# Patient Record
Sex: MALE | Race: WHITE | NOT HISPANIC OR LATINO | URBAN - METROPOLITAN AREA
[De-identification: names, ages, dates, MRNs, and addresses within clinical notes are randomized per-mention and may not be internally consistent; named-entity substitution may affect disease eponyms.]

---

## 2017-01-01 ENCOUNTER — OUTPATIENT (OUTPATIENT)
Dept: OUTPATIENT SERVICES | Facility: HOSPITAL | Age: 32
LOS: 1 days | End: 2017-01-01

## 2017-03-08 DIAGNOSIS — R69 ILLNESS, UNSPECIFIED: ICD-10-CM

## 2017-04-17 ENCOUNTER — APPOINTMENT (OUTPATIENT)
Dept: ORTHOPEDIC SURGERY | Facility: CLINIC | Age: 32
End: 2017-04-17

## 2017-04-17 VITALS
BODY MASS INDEX: 27.98 KG/M2 | HEIGHT: 75 IN | SYSTOLIC BLOOD PRESSURE: 120 MMHG | DIASTOLIC BLOOD PRESSURE: 70 MMHG | WEIGHT: 225 LBS | HEART RATE: 69 BPM

## 2017-04-17 DIAGNOSIS — M54.2 CERVICALGIA: ICD-10-CM

## 2017-04-17 DIAGNOSIS — M54.12 RADICULOPATHY, CERVICAL REGION: ICD-10-CM

## 2017-04-17 DIAGNOSIS — S49.92XA UNSPECIFIED INJURY OF LEFT SHOULDER AND UPPER ARM, INITIAL ENCOUNTER: ICD-10-CM

## 2017-04-17 DIAGNOSIS — Z78.9 OTHER SPECIFIED HEALTH STATUS: ICD-10-CM

## 2017-04-17 DIAGNOSIS — Z56.0 UNEMPLOYMENT, UNSPECIFIED: ICD-10-CM

## 2017-04-17 SDOH — ECONOMIC STABILITY - INCOME SECURITY: UNEMPLOYMENT, UNSPECIFIED: Z56.0

## 2017-06-29 ENCOUNTER — RX RENEWAL (OUTPATIENT)
Age: 32
End: 2017-06-29

## 2017-07-27 ENCOUNTER — RX RENEWAL (OUTPATIENT)
Age: 32
End: 2017-07-27

## 2017-08-24 ENCOUNTER — RX RENEWAL (OUTPATIENT)
Age: 32
End: 2017-08-24

## 2017-11-14 ENCOUNTER — OUTPATIENT (OUTPATIENT)
Dept: OUTPATIENT SERVICES | Facility: HOSPITAL | Age: 32
LOS: 1 days | Discharge: STILL ADMITTED OR ADMINISTRATIVE | End: 2017-11-14

## 2017-11-24 DIAGNOSIS — F14.10 COCAINE ABUSE, UNCOMPLICATED: ICD-10-CM

## 2017-11-24 DIAGNOSIS — F11.10 OPIOID ABUSE, UNCOMPLICATED: ICD-10-CM

## 2018-06-06 PROBLEM — M54.5 LOW BACK PAIN: Status: ACTIVE | Noted: 2018-06-06

## 2018-06-06 PROBLEM — F11.93 OPIOID USE WITH WITHDRAWAL: Status: RESOLVED | Noted: 2018-06-06 | Resolved: 2018-06-06

## 2018-06-06 PROBLEM — Z87.898 HISTORY OF MEMORY LOSS: Status: RESOLVED | Noted: 2018-06-06 | Resolved: 2018-06-06

## 2018-06-06 PROBLEM — H10.30 ACUTE BACTERIAL CONJUNCTIVITIS: Status: RESOLVED | Noted: 2018-06-06 | Resolved: 2018-06-06

## 2018-06-06 PROBLEM — Z87.19 HISTORY OF GASTRITIS: Status: RESOLVED | Noted: 2018-06-06 | Resolved: 2018-06-06

## 2018-06-06 PROBLEM — Z87.2 HISTORY OF FOLLICULITIS: Status: RESOLVED | Noted: 2018-06-06 | Resolved: 2018-06-06

## 2018-06-06 PROBLEM — R06.02 SOB (SHORTNESS OF BREATH) ON EXERTION: Status: RESOLVED | Noted: 2018-06-06 | Resolved: 2018-06-06

## 2018-06-06 PROBLEM — F39 MOOD CHANGE: Status: RESOLVED | Noted: 2018-06-06 | Resolved: 2018-06-06

## 2018-06-06 PROBLEM — Z87.898 HISTORY OF NAUSEA: Status: RESOLVED | Noted: 2018-06-06 | Resolved: 2018-06-06

## 2018-06-06 PROBLEM — H60.90 OTITIS EXTERNA: Status: RESOLVED | Noted: 2018-06-06 | Resolved: 2018-06-06

## 2018-06-06 PROBLEM — Z87.898 HISTORY OF HEADACHE: Status: RESOLVED | Noted: 2018-06-06 | Resolved: 2018-06-06

## 2018-06-06 PROBLEM — Z87.09 HISTORY OF SINUSITIS: Status: RESOLVED | Noted: 2018-06-06 | Resolved: 2018-06-06

## 2018-06-06 PROBLEM — R45.1 AGITATION: Status: RESOLVED | Noted: 2018-06-06 | Resolved: 2018-06-06

## 2018-06-06 PROBLEM — R23.8 WOUND OF SKIN: Status: ACTIVE | Noted: 2018-06-06

## 2018-06-06 PROBLEM — T30.4 CHEMICAL BURN: Status: RESOLVED | Noted: 2018-06-06 | Resolved: 2018-06-06

## 2018-06-06 PROBLEM — Z87.19 HISTORY OF ACUTE CHOLECYSTITIS: Status: RESOLVED | Noted: 2018-06-06 | Resolved: 2018-06-06

## 2018-06-06 PROBLEM — Z87.898 HISTORY OF ABDOMINAL PAIN: Status: RESOLVED | Noted: 2018-06-06 | Resolved: 2018-06-06

## 2018-06-06 PROBLEM — R41.3 MEMORY PROBLEM: Status: RESOLVED | Noted: 2018-06-06 | Resolved: 2018-06-06

## 2018-06-06 PROBLEM — F19.10 SUBSTANCE ABUSE: Status: ACTIVE | Noted: 2018-06-06

## 2018-06-06 PROBLEM — Z87.828 HISTORY OF MOTOR VEHICLE ACCIDENT: Status: RESOLVED | Noted: 2018-06-06 | Resolved: 2018-06-06

## 2018-06-06 PROBLEM — Z87.898 HISTORY OF WHEEZING: Status: RESOLVED | Noted: 2018-06-06 | Resolved: 2018-06-06

## 2018-06-06 PROBLEM — K62.5 BRBPR (BRIGHT RED BLOOD PER RECTUM): Status: RESOLVED | Noted: 2018-06-06 | Resolved: 2018-06-06

## 2018-06-06 PROBLEM — Z87.39 HISTORY OF MUSCLE SPASM: Status: RESOLVED | Noted: 2018-06-06 | Resolved: 2018-06-06

## 2018-06-06 PROBLEM — H01.003 BLEPHARITIS OF EYELID OF RIGHT EYE: Status: ACTIVE | Noted: 2018-06-06

## 2018-06-06 PROBLEM — N45.3 ORCHITIS AND EPIDIDYMITIS: Status: RESOLVED | Noted: 2018-06-06 | Resolved: 2018-06-06

## 2018-06-06 PROBLEM — M54.9 BACK PAIN: Status: ACTIVE | Noted: 2018-06-06

## 2018-06-06 PROBLEM — Z87.438 HISTORY OF PROSTATITIS: Status: RESOLVED | Noted: 2018-06-06 | Resolved: 2018-06-06

## 2018-06-06 PROBLEM — Z87.2 HISTORY OF INFECTION OF SKIN OR SUBCUTANEOUS TISSUE: Status: RESOLVED | Noted: 2018-06-06 | Resolved: 2018-06-06

## 2018-06-06 PROBLEM — R68.84 JAW PAIN: Status: RESOLVED | Noted: 2018-06-06 | Resolved: 2018-06-06

## 2018-06-06 PROBLEM — S00.80XA: Status: RESOLVED | Noted: 2018-06-06 | Resolved: 2018-06-06

## 2018-06-06 PROBLEM — B00.9 HSV-2 (HERPES SIMPLEX VIRUS 2) INFECTION: Status: ACTIVE | Noted: 2018-06-06

## 2018-06-06 PROBLEM — R05 CHRONIC COUGH: Status: ACTIVE | Noted: 2018-06-06

## 2018-06-06 PROBLEM — R06.02 SHORTNESS OF BREATH: Status: ACTIVE | Noted: 2018-06-06

## 2018-06-06 PROBLEM — Z87.2 HISTORY OF ACNE: Status: RESOLVED | Noted: 2018-06-06 | Resolved: 2018-06-06

## 2018-06-06 PROBLEM — R10.30 GROIN PAIN: Status: RESOLVED | Noted: 2018-06-06 | Resolved: 2018-06-06

## 2018-06-06 PROBLEM — Z87.2 HISTORY OF CONTACT DERMATITIS: Status: RESOLVED | Noted: 2018-06-06 | Resolved: 2018-06-06

## 2018-06-06 PROBLEM — M25.512 LEFT SHOULDER PAIN: Status: ACTIVE | Noted: 2018-06-06

## 2018-06-06 PROBLEM — E53.8 VITAMIN B12 DEFICIENCY: Status: ACTIVE | Noted: 2018-06-06

## 2018-06-06 PROBLEM — Z87.898 HISTORY OF FACIAL PAIN: Status: RESOLVED | Noted: 2018-06-06 | Resolved: 2018-06-06

## 2018-06-06 PROBLEM — Z87.898 HISTORY OF GAIT DISORDER: Status: RESOLVED | Noted: 2018-06-06 | Resolved: 2018-06-06

## 2018-06-06 PROBLEM — F80.89 DEFICIT IN COMMUNICATION DUE TO SLURRED SPEECH: Status: RESOLVED | Noted: 2018-06-06 | Resolved: 2018-06-06

## 2018-06-06 PROBLEM — D22.9 NUMEROUS MOLES: Status: ACTIVE | Noted: 2018-06-06

## 2018-06-06 PROBLEM — E55.9 VITAMIN D DEFICIENCY: Status: ACTIVE | Noted: 2018-06-06

## 2018-06-06 PROBLEM — Z87.09 HISTORY OF POSTNASAL DRIP: Status: RESOLVED | Noted: 2018-06-06 | Resolved: 2018-06-06

## 2018-06-06 PROBLEM — E87.5 HIGH POTASSIUM: Status: ACTIVE | Noted: 2018-06-06

## 2018-06-08 ENCOUNTER — APPOINTMENT (OUTPATIENT)
Age: 33
End: 2018-06-08
Payer: MEDICAID

## 2018-06-08 VITALS
BODY MASS INDEX: 27.98 KG/M2 | DIASTOLIC BLOOD PRESSURE: 80 MMHG | WEIGHT: 225 LBS | HEIGHT: 75 IN | SYSTOLIC BLOOD PRESSURE: 130 MMHG | TEMPERATURE: 97.8 F

## 2018-06-08 DIAGNOSIS — H60.90 UNSPECIFIED OTITIS EXTERNA, UNSPECIFIED EAR: ICD-10-CM

## 2018-06-08 DIAGNOSIS — N45.3 EPIDIDYMO-ORCHITIS: ICD-10-CM

## 2018-06-08 DIAGNOSIS — Z87.19 PERSONAL HISTORY OF OTHER DISEASES OF THE DIGESTIVE SYSTEM: ICD-10-CM

## 2018-06-08 DIAGNOSIS — Z87.09 PERSONAL HISTORY OF OTHER DISEASES OF THE RESPIRATORY SYSTEM: ICD-10-CM

## 2018-06-08 DIAGNOSIS — Z87.898 PERSONAL HISTORY OF OTHER SPECIFIED CONDITIONS: ICD-10-CM

## 2018-06-08 DIAGNOSIS — M54.9 DORSALGIA, UNSPECIFIED: ICD-10-CM

## 2018-06-08 DIAGNOSIS — M54.5 LOW BACK PAIN: ICD-10-CM

## 2018-06-08 DIAGNOSIS — E55.9 VITAMIN D DEFICIENCY, UNSPECIFIED: ICD-10-CM

## 2018-06-08 DIAGNOSIS — R10.30 LOWER ABDOMINAL PAIN, UNSPECIFIED: ICD-10-CM

## 2018-06-08 DIAGNOSIS — Z87.2 PERSONAL HISTORY OF DISEASES OF THE SKIN AND SUBCUTANEOUS TISSUE: ICD-10-CM

## 2018-06-08 DIAGNOSIS — E53.8 DEFICIENCY OF OTHER SPECIFIED B GROUP VITAMINS: ICD-10-CM

## 2018-06-08 DIAGNOSIS — R06.02 SHORTNESS OF BREATH: ICD-10-CM

## 2018-06-08 DIAGNOSIS — D22.9 MELANOCYTIC NEVI, UNSPECIFIED: ICD-10-CM

## 2018-06-08 DIAGNOSIS — K62.5 HEMORRHAGE OF ANUS AND RECTUM: ICD-10-CM

## 2018-06-08 DIAGNOSIS — R23.8 OTHER SKIN CHANGES: ICD-10-CM

## 2018-06-08 DIAGNOSIS — Z87.828 PERSONAL HISTORY OF OTHER (HEALED) PHYSICAL INJURY AND TRAUMA: ICD-10-CM

## 2018-06-08 DIAGNOSIS — R68.84 JAW PAIN: ICD-10-CM

## 2018-06-08 DIAGNOSIS — H10.30 UNSPECIFIED ACUTE CONJUNCTIVITIS, UNSPECIFIED EYE: ICD-10-CM

## 2018-06-08 DIAGNOSIS — H01.003 UNSPECIFIED BLEPHARITIS RIGHT EYE, UNSPECIFIED EYELID: ICD-10-CM

## 2018-06-08 DIAGNOSIS — F80.89 OTHER DEVELOPMENTAL DISORDERS OF SPEECH AND LANGUAGE: ICD-10-CM

## 2018-06-08 DIAGNOSIS — Z87.438 PERSONAL HISTORY OF OTHER DISEASES OF MALE GENITAL ORGANS: ICD-10-CM

## 2018-06-08 DIAGNOSIS — F19.10 OTHER PSYCHOACTIVE SUBSTANCE ABUSE, UNCOMPLICATED: ICD-10-CM

## 2018-06-08 DIAGNOSIS — S00.80XA UNSPECIFIED SUPERFICIAL INJURY OF OTHER PART OF HEAD, INITIAL ENCOUNTER: ICD-10-CM

## 2018-06-08 DIAGNOSIS — E87.5 HYPERKALEMIA: ICD-10-CM

## 2018-06-08 DIAGNOSIS — L08.9 UNSPECIFIED SUPERFICIAL INJURY OF OTHER PART OF HEAD, INITIAL ENCOUNTER: ICD-10-CM

## 2018-06-08 DIAGNOSIS — F39 UNSPECIFIED MOOD [AFFECTIVE] DISORDER: ICD-10-CM

## 2018-06-08 DIAGNOSIS — M25.571 PAIN IN RIGHT ANKLE AND JOINTS OF RIGHT FOOT: ICD-10-CM

## 2018-06-08 DIAGNOSIS — R41.3 OTHER AMNESIA: ICD-10-CM

## 2018-06-08 DIAGNOSIS — F19.20 OTHER PSYCHOACTIVE SUBSTANCE DEPENDENCE, UNCOMPLICATED: ICD-10-CM

## 2018-06-08 DIAGNOSIS — B00.9 HERPESVIRAL INFECTION, UNSPECIFIED: ICD-10-CM

## 2018-06-08 DIAGNOSIS — R05 COUGH: ICD-10-CM

## 2018-06-08 DIAGNOSIS — T30.4 CORROSION OF UNSPECIFIED BODY REGION, UNSPECIFIED DEGREE: ICD-10-CM

## 2018-06-08 DIAGNOSIS — F11.93 OPIOID USE, UNSPECIFIED WITH WITHDRAWAL: ICD-10-CM

## 2018-06-08 DIAGNOSIS — Z87.39 PERSONAL HISTORY OF OTHER DISEASES OF THE MUSCULOSKELETAL SYSTEM AND CONNECTIVE TISSUE: ICD-10-CM

## 2018-06-08 DIAGNOSIS — N50.819 TESTICULAR PAIN, UNSPECIFIED: ICD-10-CM

## 2018-06-08 DIAGNOSIS — M25.512 PAIN IN LEFT SHOULDER: ICD-10-CM

## 2018-06-08 DIAGNOSIS — J30.2 OTHER SEASONAL ALLERGIC RHINITIS: ICD-10-CM

## 2018-06-08 DIAGNOSIS — R30.0 DYSURIA: ICD-10-CM

## 2018-06-08 DIAGNOSIS — R45.1 RESTLESSNESS AND AGITATION: ICD-10-CM

## 2018-06-08 PROCEDURE — 99214 OFFICE O/P EST MOD 30 MIN: CPT

## 2018-06-08 RX ORDER — HYDROXYZINE PAMOATE 50 MG/1
50 CAPSULE ORAL
Qty: 180 | Refills: 0 | Status: COMPLETED | COMMUNITY
Start: 2018-03-18

## 2018-06-08 RX ORDER — OFLOXACIN 3 MG/ML
0.3 SOLUTION/ DROPS OPHTHALMIC
Qty: 5 | Refills: 0 | Status: COMPLETED | COMMUNITY
Start: 2018-03-06

## 2018-06-08 RX ORDER — QUETIAPINE 400 MG/1
TABLET, FILM COATED ORAL
Refills: 0 | Status: COMPLETED | COMMUNITY
End: 2018-06-08

## 2018-06-08 RX ORDER — QUETIAPINE 25 MG/1
25 TABLET, FILM COATED ORAL
Refills: 0 | Status: COMPLETED | COMMUNITY
End: 2018-06-08

## 2018-06-08 RX ORDER — CHROMIUM 200 MCG
TABLET ORAL
Refills: 0 | Status: COMPLETED | COMMUNITY
End: 2018-06-08

## 2018-06-08 RX ORDER — DIAZEPAM 10 MG/1
10 TABLET ORAL
Qty: 45 | Refills: 0 | Status: COMPLETED | COMMUNITY
Start: 2018-03-16

## 2018-06-08 RX ORDER — PROMETHAZINE HYDROCHLORIDE AND DEXTROMETHORPHAN HYDROBROMIDE ORAL SOLUTION 15; 6.25 MG/5ML; MG/5ML
6.25-15 SOLUTION ORAL
Refills: 0 | Status: COMPLETED | COMMUNITY
End: 2018-06-08

## 2018-06-08 RX ORDER — ALPRAZOLAM 1 MG/1
1 TABLET ORAL
Qty: 28 | Refills: 0 | Status: COMPLETED | COMMUNITY
Start: 2018-05-21

## 2018-06-08 RX ORDER — SULFAMETHOXAZOLE AND TRIMETHOPRIM 800; 160 MG/1; MG/1
800-160 TABLET ORAL
Qty: 14 | Refills: 0 | Status: COMPLETED | COMMUNITY
Start: 2018-03-06

## 2018-06-08 RX ORDER — CEPHALEXIN 500 MG/1
500 CAPSULE ORAL 3 TIMES DAILY
Refills: 0 | Status: COMPLETED | COMMUNITY
Start: 2018-05-09 | End: 2018-06-08

## 2018-06-08 RX ORDER — HYDROCODONE BITARTRATE AND HOMATROPINE METHYLBROMIDE 5; 1.5 MG/5ML; MG/5ML
5-1.5 SOLUTION ORAL
Qty: 200 | Refills: 0 | Status: COMPLETED | COMMUNITY
Start: 2018-03-19

## 2018-06-08 RX ORDER — GABAPENTIN 300 MG/1
300 CAPSULE ORAL
Qty: 90 | Refills: 0 | Status: COMPLETED | COMMUNITY
Start: 2018-01-02

## 2018-06-08 RX ORDER — HYDROCODONE BITARTRATE AND HOMATROPINE METHYLBROMIDE 5; 1.5 MG/5ML; MG/5ML
5-1.5 SYRUP ORAL
Refills: 0 | Status: COMPLETED | COMMUNITY
End: 2018-06-08

## 2018-06-08 RX ORDER — AMOXICILLIN 875 MG/1
875 TABLET, FILM COATED ORAL
Qty: 20 | Refills: 0 | Status: COMPLETED | COMMUNITY
Start: 2017-12-28

## 2018-06-08 RX ORDER — QUETIAPINE FUMARATE 100 MG/1
100 TABLET ORAL
Qty: 30 | Refills: 0 | Status: COMPLETED | COMMUNITY
Start: 2017-12-26

## 2018-06-08 RX ORDER — GABAPENTIN 400 MG/1
400 CAPSULE ORAL
Qty: 90 | Refills: 0 | Status: COMPLETED | COMMUNITY
Start: 2017-12-21

## 2018-06-08 RX ORDER — VALACYCLOVIR 500 MG/1
500 TABLET, FILM COATED ORAL
Qty: 30 | Refills: 0 | Status: COMPLETED | COMMUNITY
Start: 2018-02-22

## 2018-06-08 RX ORDER — DICLOFENAC SODIUM 75 MG/1
75 TABLET, DELAYED RELEASE ORAL
Qty: 60 | Refills: 0 | Status: COMPLETED | COMMUNITY
Start: 2017-04-17 | End: 2018-06-08

## 2018-06-08 RX ORDER — ERYTHROMYCIN 5 MG/G
5 OINTMENT OPHTHALMIC
Qty: 4 | Refills: 0 | Status: COMPLETED | COMMUNITY
Start: 2018-01-26

## 2018-06-08 RX ORDER — NICOTINE POLACRILEX 2 MG/1
2 GUM, CHEWING ORAL
Qty: 110 | Refills: 0 | Status: COMPLETED | COMMUNITY
Start: 2017-12-21

## 2018-06-08 RX ORDER — FOLIC ACID 1 MG/1
1 TABLET ORAL
Qty: 30 | Refills: 0 | Status: COMPLETED | COMMUNITY
Start: 2018-05-02

## 2018-06-08 RX ORDER — ERGOCALCIFEROL (VITAMIN D2) 1250 MCG
50000 CAPSULE ORAL
Refills: 0 | Status: COMPLETED | COMMUNITY
End: 2018-06-08

## 2018-06-08 RX ORDER — CHOLECALCIFEROL (VITAMIN D3) 1250 MCG
1.25 MG CAPSULE ORAL
Qty: 2 | Refills: 0 | Status: COMPLETED | COMMUNITY
Start: 2018-05-02

## 2018-06-08 RX ORDER — CYANOCOBALAMIN (VITAMIN B-12) 1000 MCG
500 TABLET, SUBLINGUAL SUBLINGUAL
Refills: 0 | Status: COMPLETED | COMMUNITY
End: 2018-06-08

## 2018-06-08 NOTE — PLAN
[FreeTextEntry1] : Patient was instructed to go the emergency department for further evaluation of his right ankle pain. 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 Spoke with patient in depth for at least 25 minutes about the need to go into further detox program. Patient is in agreement with me and is willing to start detoxing as he is aware that he is abusing his medications.\par Patient will wait for family member to bring him to the hospital as he states his mother will go with him. He has only been giving him a small supply of pain medications to hold him over until he is evaluated and taken care of by emergency room. I also would like for him to get evaluated for possible detox program through the hospital. Patient will discuss with the hospital about detox programs available to him.

## 2018-06-08 NOTE — PHYSICAL EXAM
[de-identified] : right ankle swelling significant compared with left ankle. Slight bruising on right lateral aspect of right ankle. Unable to perform exam as patient extensive pain and refusal to touch his ankle or foot

## 2018-06-08 NOTE — HISTORY OF PRESENT ILLNESS
[FreeTextEntry8] : 33 y/o male present with complaints of swollen ankle, and coughing, also Pt would like to come of medication he has been taken, \par \par Patient presents to the office with right ankle pain and swelling for the last 24 hours. He states he fell into an irrigation whole causing him to twists his ankle and causing pain.\par He did not seek medical attention yesterday as he did not feel it was that bad. This morning his ankle is very swollen and with chronic throbbing pain a 10 out of 10.\par \par patient does have a long standing history of drug abuse and misuse.

## 2018-06-25 ENCOUNTER — RX RENEWAL (OUTPATIENT)
Age: 33
End: 2018-06-25

## 2018-07-26 PROBLEM — J30.2 SEASONAL ALLERGIES: Status: ACTIVE | Noted: 2018-06-06

## 2018-08-15 ENCOUNTER — APPOINTMENT (OUTPATIENT)
Dept: INTERNAL MEDICINE | Facility: CLINIC | Age: 33
End: 2018-08-15
Payer: MEDICAID

## 2018-08-15 VITALS
BODY MASS INDEX: 27.98 KG/M2 | TEMPERATURE: 97.3 F | DIASTOLIC BLOOD PRESSURE: 86 MMHG | HEIGHT: 75 IN | SYSTOLIC BLOOD PRESSURE: 138 MMHG | WEIGHT: 225 LBS

## 2018-08-15 DIAGNOSIS — R05 COUGH: ICD-10-CM

## 2018-08-15 DIAGNOSIS — Z13.89 ENCOUNTER FOR SCREENING FOR OTHER DISORDER: ICD-10-CM

## 2018-08-15 DIAGNOSIS — Z87.898 PERSONAL HISTORY OF OTHER SPECIFIED CONDITIONS: ICD-10-CM

## 2018-08-15 DIAGNOSIS — R09.82 POSTNASAL DRIP: ICD-10-CM

## 2018-08-15 DIAGNOSIS — Z87.09 PERSONAL HISTORY OF OTHER DISEASES OF THE RESPIRATORY SYSTEM: ICD-10-CM

## 2018-08-15 PROCEDURE — 99214 OFFICE O/P EST MOD 30 MIN: CPT

## 2018-08-15 RX ORDER — OXYCODONE AND ACETAMINOPHEN 10; 325 MG/1; MG/1
10-325 TABLET ORAL TWICE DAILY
Qty: 6 | Refills: 0 | Status: COMPLETED | COMMUNITY
Start: 2018-06-08 | End: 2018-08-15

## 2018-08-15 RX ORDER — QUETIAPINE FUMARATE 200 MG/1
200 TABLET ORAL
Qty: 60 | Refills: 0 | Status: COMPLETED | COMMUNITY
Start: 2018-03-16 | End: 2018-08-15

## 2018-08-15 RX ORDER — CLONAZEPAM 2 MG/1
2 TABLET ORAL
Qty: 60 | Refills: 0 | Status: COMPLETED | COMMUNITY
Start: 2018-03-02 | End: 2018-08-15

## 2018-08-15 RX ORDER — GABAPENTIN 600 MG/1
600 TABLET, COATED ORAL
Qty: 90 | Refills: 0 | Status: COMPLETED | COMMUNITY
Start: 2018-03-18 | End: 2018-08-15

## 2018-08-15 NOTE — PHYSICAL EXAM
[No Acute Distress] : no acute distress [Well Nourished] : well nourished [No Respiratory Distress] : no respiratory distress  [Clear to Auscultation] : lungs were clear to auscultation bilaterally [No Accessory Muscle Use] : no accessory muscle use [Normal Rate] : normal rate  [Regular Rhythm] : with a regular rhythm [Normal S1, S2] : normal S1 and S2 [Normal Affect] : the affect was normal [Normal Insight/Judgement] : insight and judgment were intact [de-identified] : Postnasal drip present nasal mucosal swelling and erythema

## 2018-08-15 NOTE — REVIEW OF SYSTEMS
[Insomnia] : insomnia [Anxiety] : no anxiety [Negative] : Cardiovascular [FreeTextEntry4] : Sinus pressure and congestion [FreeTextEntry6] : Productive cough

## 2018-08-15 NOTE — PLAN
[FreeTextEntry1] : Patient will restart Seroquel as given above for insomnia\par He was started medications for his upper respiratory symptoms patient has been advised no pain medication will be given in the form of cough medication.\par Patient is to followup one week if not feeling better or before then worsens

## 2018-08-15 NOTE — HISTORY OF PRESENT ILLNESS
[FreeTextEntry8] : 33 y/o male present with complaints of not feeling well and not being able to sleep \par Patient presents to the office today with complaint of insomnia after stopping his benzodiazepine medications for last 2 months\par He would like to restart a seroquel that he has been on previously\par The last 2 days patient has been with cough and sinus congestion no fevers or chills

## 2018-08-28 ENCOUNTER — RX RENEWAL (OUTPATIENT)
Age: 33
End: 2018-08-28

## 2018-10-10 ENCOUNTER — RX RENEWAL (OUTPATIENT)
Age: 33
End: 2018-10-10

## 2018-11-20 ENCOUNTER — RX RENEWAL (OUTPATIENT)
Age: 33
End: 2018-11-20

## 2019-01-16 ENCOUNTER — EMERGENCY (EMERGENCY)
Facility: HOSPITAL | Age: 34
LOS: 1 days | Discharge: HOME | End: 2019-01-16
Attending: EMERGENCY MEDICINE | Admitting: EMERGENCY MEDICINE
Payer: MEDICAID

## 2019-01-16 VITALS
RESPIRATION RATE: 20 BRPM | WEIGHT: 229.94 LBS | DIASTOLIC BLOOD PRESSURE: 90 MMHG | HEIGHT: 74 IN | HEART RATE: 83 BPM | OXYGEN SATURATION: 100 % | TEMPERATURE: 98 F | SYSTOLIC BLOOD PRESSURE: 149 MMHG

## 2019-01-16 VITALS
OXYGEN SATURATION: 97 % | DIASTOLIC BLOOD PRESSURE: 94 MMHG | TEMPERATURE: 98 F | RESPIRATION RATE: 18 BRPM | SYSTOLIC BLOOD PRESSURE: 141 MMHG | HEART RATE: 78 BPM

## 2019-01-16 LAB
ALBUMIN SERPL ELPH-MCNC: 4.4 G/DL — SIGNIFICANT CHANGE UP (ref 3.3–5)
ALP SERPL-CCNC: 75 U/L — SIGNIFICANT CHANGE UP (ref 40–120)
ALT FLD-CCNC: 27 U/L — SIGNIFICANT CHANGE UP (ref 12–78)
ANION GAP SERPL CALC-SCNC: 9 MMOL/L — SIGNIFICANT CHANGE UP (ref 5–17)
APPEARANCE UR: CLEAR — SIGNIFICANT CHANGE UP
AST SERPL-CCNC: 20 U/L — SIGNIFICANT CHANGE UP (ref 15–37)
BACTERIA # UR AUTO: ABNORMAL
BASOPHILS # BLD AUTO: 0.05 K/UL — SIGNIFICANT CHANGE UP (ref 0–0.2)
BASOPHILS NFR BLD AUTO: 0.4 % — SIGNIFICANT CHANGE UP (ref 0–2)
BILIRUB SERPL-MCNC: 0.9 MG/DL — SIGNIFICANT CHANGE UP (ref 0.2–1.2)
BILIRUB UR-MCNC: ABNORMAL
BUN SERPL-MCNC: 14 MG/DL — SIGNIFICANT CHANGE UP (ref 7–23)
CALCIUM SERPL-MCNC: 9.2 MG/DL — SIGNIFICANT CHANGE UP (ref 8.5–10.1)
CHLORIDE SERPL-SCNC: 106 MMOL/L — SIGNIFICANT CHANGE UP (ref 96–108)
CO2 SERPL-SCNC: 24 MMOL/L — SIGNIFICANT CHANGE UP (ref 22–31)
COLOR SPEC: YELLOW — SIGNIFICANT CHANGE UP
COMMENT - URINE: SIGNIFICANT CHANGE UP
CREAT SERPL-MCNC: 1.3 MG/DL — SIGNIFICANT CHANGE UP (ref 0.5–1.3)
DIFF PNL FLD: NEGATIVE — SIGNIFICANT CHANGE UP
EOSINOPHIL # BLD AUTO: 0.02 K/UL — SIGNIFICANT CHANGE UP (ref 0–0.5)
EOSINOPHIL NFR BLD AUTO: 0.2 % — SIGNIFICANT CHANGE UP (ref 0–6)
EPI CELLS # UR: NEGATIVE — SIGNIFICANT CHANGE UP
FLU A RESULT: SIGNIFICANT CHANGE UP
FLU A RESULT: SIGNIFICANT CHANGE UP
FLUAV AG NPH QL: SIGNIFICANT CHANGE UP
FLUBV AG NPH QL: SIGNIFICANT CHANGE UP
GLUCOSE SERPL-MCNC: 156 MG/DL — HIGH (ref 70–99)
GLUCOSE UR QL: NEGATIVE — SIGNIFICANT CHANGE UP
HCT VFR BLD CALC: 47.1 % — SIGNIFICANT CHANGE UP (ref 39–50)
HGB BLD-MCNC: 16 G/DL — SIGNIFICANT CHANGE UP (ref 13–17)
IMM GRANULOCYTES NFR BLD AUTO: 0.3 % — SIGNIFICANT CHANGE UP (ref 0–1.5)
KETONES UR-MCNC: ABNORMAL
LEUKOCYTE ESTERASE UR-ACNC: NEGATIVE — SIGNIFICANT CHANGE UP
LIDOCAIN IGE QN: 151 U/L — SIGNIFICANT CHANGE UP (ref 73–393)
LYMPHOCYTES # BLD AUTO: 1.46 K/UL — SIGNIFICANT CHANGE UP (ref 1–3.3)
LYMPHOCYTES # BLD AUTO: 12.6 % — LOW (ref 13–44)
MCHC RBC-ENTMCNC: 31.2 PG — SIGNIFICANT CHANGE UP (ref 27–34)
MCHC RBC-ENTMCNC: 34 GM/DL — SIGNIFICANT CHANGE UP (ref 32–36)
MCV RBC AUTO: 91.8 FL — SIGNIFICANT CHANGE UP (ref 80–100)
MONOCYTES # BLD AUTO: 0.68 K/UL — SIGNIFICANT CHANGE UP (ref 0–0.9)
MONOCYTES NFR BLD AUTO: 5.9 % — SIGNIFICANT CHANGE UP (ref 2–14)
NEUTROPHILS # BLD AUTO: 9.34 K/UL — HIGH (ref 1.8–7.4)
NEUTROPHILS NFR BLD AUTO: 80.6 % — HIGH (ref 43–77)
NITRITE UR-MCNC: NEGATIVE — SIGNIFICANT CHANGE UP
NRBC # BLD: 0 /100 WBCS — SIGNIFICANT CHANGE UP (ref 0–0)
PH UR: 5 — SIGNIFICANT CHANGE UP (ref 5–8)
PLATELET # BLD AUTO: 275 K/UL — SIGNIFICANT CHANGE UP (ref 150–400)
POTASSIUM SERPL-MCNC: 4 MMOL/L — SIGNIFICANT CHANGE UP (ref 3.5–5.3)
POTASSIUM SERPL-SCNC: 4 MMOL/L — SIGNIFICANT CHANGE UP (ref 3.5–5.3)
PROT SERPL-MCNC: 8.5 G/DL — HIGH (ref 6–8.3)
PROT UR-MCNC: 25 MG/DL
RBC # BLD: 5.13 M/UL — SIGNIFICANT CHANGE UP (ref 4.2–5.8)
RBC # FLD: 12.3 % — SIGNIFICANT CHANGE UP (ref 10.3–14.5)
RBC CASTS # UR COMP ASSIST: NEGATIVE /HPF — SIGNIFICANT CHANGE UP (ref 0–4)
RSV RESULT: SIGNIFICANT CHANGE UP
RSV RNA RESP QL NAA+PROBE: SIGNIFICANT CHANGE UP
SODIUM SERPL-SCNC: 139 MMOL/L — SIGNIFICANT CHANGE UP (ref 135–145)
SP GR SPEC: 1.02 — SIGNIFICANT CHANGE UP (ref 1.01–1.02)
UROBILINOGEN FLD QL: NEGATIVE — SIGNIFICANT CHANGE UP
WBC # BLD: 11.59 K/UL — HIGH (ref 3.8–10.5)
WBC # FLD AUTO: 11.59 K/UL — HIGH (ref 3.8–10.5)
WBC UR QL: SIGNIFICANT CHANGE UP

## 2019-01-16 PROCEDURE — 96376 TX/PRO/DX INJ SAME DRUG ADON: CPT

## 2019-01-16 PROCEDURE — 87086 URINE CULTURE/COLONY COUNT: CPT

## 2019-01-16 PROCEDURE — 71046 X-RAY EXAM CHEST 2 VIEWS: CPT | Mod: 26

## 2019-01-16 PROCEDURE — 96361 HYDRATE IV INFUSION ADD-ON: CPT

## 2019-01-16 PROCEDURE — 80053 COMPREHEN METABOLIC PANEL: CPT

## 2019-01-16 PROCEDURE — 96374 THER/PROPH/DIAG INJ IV PUSH: CPT | Mod: XU

## 2019-01-16 PROCEDURE — 74177 CT ABD & PELVIS W/CONTRAST: CPT

## 2019-01-16 PROCEDURE — 96375 TX/PRO/DX INJ NEW DRUG ADDON: CPT

## 2019-01-16 PROCEDURE — 74177 CT ABD & PELVIS W/CONTRAST: CPT | Mod: 26

## 2019-01-16 PROCEDURE — 99284 EMERGENCY DEPT VISIT MOD MDM: CPT | Mod: 25

## 2019-01-16 PROCEDURE — 99285 EMERGENCY DEPT VISIT HI MDM: CPT

## 2019-01-16 PROCEDURE — 83690 ASSAY OF LIPASE: CPT

## 2019-01-16 PROCEDURE — 85027 COMPLETE CBC AUTOMATED: CPT

## 2019-01-16 PROCEDURE — 81001 URINALYSIS AUTO W/SCOPE: CPT

## 2019-01-16 PROCEDURE — 71046 X-RAY EXAM CHEST 2 VIEWS: CPT

## 2019-01-16 PROCEDURE — 87631 RESP VIRUS 3-5 TARGETS: CPT

## 2019-01-16 PROCEDURE — 36415 COLL VENOUS BLD VENIPUNCTURE: CPT

## 2019-01-16 RX ORDER — ONDANSETRON 8 MG/1
1 TABLET, FILM COATED ORAL
Qty: 9 | Refills: 0 | OUTPATIENT
Start: 2019-01-16 | End: 2019-01-18

## 2019-01-16 RX ORDER — SODIUM CHLORIDE 9 MG/ML
3 INJECTION INTRAMUSCULAR; INTRAVENOUS; SUBCUTANEOUS ONCE
Qty: 0 | Refills: 0 | Status: COMPLETED | OUTPATIENT
Start: 2019-01-16 | End: 2019-01-16

## 2019-01-16 RX ORDER — OXYCODONE AND ACETAMINOPHEN 5; 325 MG/1; MG/1
1 TABLET ORAL ONCE
Qty: 0 | Refills: 0 | Status: DISCONTINUED | OUTPATIENT
Start: 2019-01-16 | End: 2019-01-16

## 2019-01-16 RX ORDER — ONDANSETRON 8 MG/1
4 TABLET, FILM COATED ORAL ONCE
Qty: 0 | Refills: 0 | Status: COMPLETED | OUTPATIENT
Start: 2019-01-16 | End: 2019-01-16

## 2019-01-16 RX ORDER — KETOROLAC TROMETHAMINE 30 MG/ML
30 SYRINGE (ML) INJECTION ONCE
Qty: 0 | Refills: 0 | Status: DISCONTINUED | OUTPATIENT
Start: 2019-01-16 | End: 2019-01-16

## 2019-01-16 RX ORDER — SODIUM CHLORIDE 9 MG/ML
1000 INJECTION INTRAMUSCULAR; INTRAVENOUS; SUBCUTANEOUS ONCE
Qty: 0 | Refills: 0 | Status: COMPLETED | OUTPATIENT
Start: 2019-01-16 | End: 2019-01-16

## 2019-01-16 RX ADMIN — Medication 30 MILLIGRAM(S): at 16:47

## 2019-01-16 RX ADMIN — SODIUM CHLORIDE 1000 MILLILITER(S): 9 INJECTION INTRAMUSCULAR; INTRAVENOUS; SUBCUTANEOUS at 17:47

## 2019-01-16 RX ADMIN — SODIUM CHLORIDE 3 MILLILITER(S): 9 INJECTION INTRAMUSCULAR; INTRAVENOUS; SUBCUTANEOUS at 16:47

## 2019-01-16 RX ADMIN — OXYCODONE AND ACETAMINOPHEN 1 TABLET(S): 5; 325 TABLET ORAL at 20:00

## 2019-01-16 RX ADMIN — ONDANSETRON 4 MILLIGRAM(S): 8 TABLET, FILM COATED ORAL at 17:37

## 2019-01-16 RX ADMIN — SODIUM CHLORIDE 1000 MILLILITER(S): 9 INJECTION INTRAMUSCULAR; INTRAVENOUS; SUBCUTANEOUS at 16:46

## 2019-01-16 RX ADMIN — OXYCODONE AND ACETAMINOPHEN 1 TABLET(S): 5; 325 TABLET ORAL at 19:00

## 2019-01-16 RX ADMIN — ONDANSETRON 4 MILLIGRAM(S): 8 TABLET, FILM COATED ORAL at 15:53

## 2019-01-16 RX ADMIN — Medication 30 MILLIGRAM(S): at 17:17

## 2019-01-16 NOTE — ED PROVIDER NOTE - MEDICAL DECISION MAKING DETAILS
pt with cough x 2-3 months, and few days of left abd pain, diarhea, nausea - labs/xr/ct/ivf/toradol/zofran

## 2019-01-16 NOTE — ED ADULT NURSE REASSESSMENT NOTE - NS ED NURSE REASSESS COMMENT FT1
Patient anxious and complaining of pain in back and stomach. Still complains of nausea.  informed. Will continue to monitor.
Patient complaining of pain in back. Informed MD. wILL CONTINUE TO MONITOR. gIRLFRIEND AT BEDSIDE.
1945 Pt. received alert and oriented x4, pt. verbalized anxiety, requested psychiatric consult for discharge. Dr. Leger made aware.

## 2019-01-16 NOTE — ED PROVIDER NOTE - OBJECTIVE STATEMENT
pt c/o 2-3 months of cough and few days of left abd pain, nausea, diarrhea. no fevers, chills, ha, vomiting, cp, sob, edema, dysuria, freq.  pmd - anwar

## 2019-01-16 NOTE — ED ADULT NURSE NOTE - OBJECTIVE STATEMENT
Jennifer complaining of cough for 3 months, decreased appetite. profuse sweating, no fever. Came to ED coz his back aches and stomach aches with the coughing. Patient is anxious and nauseous.

## 2019-01-17 ENCOUNTER — RX RENEWAL (OUTPATIENT)
Age: 34
End: 2019-01-17

## 2019-01-17 ENCOUNTER — CLINICAL ADVICE (OUTPATIENT)
Age: 34
End: 2019-01-17

## 2019-01-17 LAB
CULTURE RESULTS: NO GROWTH — SIGNIFICANT CHANGE UP
SPECIMEN SOURCE: SIGNIFICANT CHANGE UP

## 2019-03-25 ENCOUNTER — TRANSCRIPTION ENCOUNTER (OUTPATIENT)
Age: 34
End: 2019-03-25

## 2019-04-05 ENCOUNTER — EMERGENCY (EMERGENCY)
Facility: HOSPITAL | Age: 34
LOS: 1 days | Discharge: HOME | End: 2019-04-05
Attending: EMERGENCY MEDICINE | Admitting: EMERGENCY MEDICINE
Payer: MEDICAID

## 2019-04-05 VITALS
TEMPERATURE: 98 F | SYSTOLIC BLOOD PRESSURE: 158 MMHG | RESPIRATION RATE: 16 BRPM | DIASTOLIC BLOOD PRESSURE: 85 MMHG | WEIGHT: 210.1 LBS | HEART RATE: 86 BPM | HEIGHT: 74 IN | OXYGEN SATURATION: 99 %

## 2019-04-05 PROCEDURE — 99283 EMERGENCY DEPT VISIT LOW MDM: CPT

## 2019-04-05 RX ORDER — OXYCODONE AND ACETAMINOPHEN 5; 325 MG/1; MG/1
1 TABLET ORAL ONCE
Qty: 0 | Refills: 0 | Status: DISCONTINUED | OUTPATIENT
Start: 2019-04-05 | End: 2019-04-05

## 2019-04-05 RX ORDER — OXYCODONE HYDROCHLORIDE 5 MG/1
1 TABLET ORAL
Qty: 12 | Refills: 0 | OUTPATIENT
Start: 2019-04-05 | End: 2019-04-07

## 2019-04-05 RX ADMIN — Medication 450 MILLIGRAM(S): at 14:22

## 2019-04-05 RX ADMIN — OXYCODONE AND ACETAMINOPHEN 1 TABLET(S): 5; 325 TABLET ORAL at 14:22

## 2019-04-05 NOTE — ED ADULT NURSE NOTE - OBJECTIVE STATEMENT
Pt awake and alert states that while working a "wood chip" hit pt right cheek, pt had wood removed prior to arrival, area clean and dry at this time

## 2019-04-05 NOTE — ED PROVIDER NOTE - OBJECTIVE STATEMENT
34 yo male c/o right sided facial infection on cheek x 3 days, s/p working with wood 3 days ago, had a small piece of wood hit his face, states he was able to remove the entire splinter, tetanus up to date (last year) but complaining of pain and redness around the wound.  No fever/chills.  +smoker.

## 2019-04-05 NOTE — ED PROVIDER NOTE - NSFOLLOWUPINSTRUCTIONS_ED_ALL_ED_FT
1) Follow-up with your Primary Medical Doctor or referred doctor. Call today / next business day for prompt follow-up.  2) Return to Emergency room for any worsening or persistent pain, weakness, fever, or any other concerning symptoms.  3) See attached instruction sheets for additional information, including information regarding signs and symptoms to look out for, reasons to seek immediate care and other important instructions.  4) Take clindamycin 450mg every 8 hours for 7 days.  5) Percocet 5mg every 6 hours for pain as needed.  Do not operate any machinery while on this medication.

## 2019-04-05 NOTE — ED PROVIDER NOTE - CLINICAL SUMMARY MEDICAL DECISION MAKING FREE TEXT BOX
right sided facial infection, could be early cellulitis, trial of PO abx, PO analgesia, recommend to return if symptoms worsen

## 2019-04-05 NOTE — ED PROVIDER NOTE - NS ED ROS FT

## 2019-04-05 NOTE — ED PROVIDER NOTE - PHYSICAL EXAMINATION
Gen: Alert, NAD  Head/eyes: NC/AT, PERRL, EOMI  ENT: airway patent, no through and through wound  Neck: supple, no tenderness/meningismus/JVD, Trachea midline  Pulm/lung: Bilateral clear BS, normal resp effort, no wheeze/stridor/retractions  CV/heart: RRR, no M/R/G, +2 dist pulses (radial, pedal DP/PT, popliteal)  GI/Abd: soft, NT/ND, +BS, no guarding/rebound tenderness  Musculoskeletal: no edema/erythema/cyanosis, FROM in all extremities, no C/T/L spine ttp  Skin: +right cheek wound 0.5cm diameter, +erythema around wound, +ttp, no drainage, no FB noted, no warmth  Neuro: AAOx3, CN 2-12 intact, normal sensation, 5/5 motor strength in all extremities, normal gait

## 2019-05-01 ENCOUNTER — EMERGENCY (EMERGENCY)
Facility: HOSPITAL | Age: 34
LOS: 1 days | Discharge: HOME | End: 2019-05-01
Attending: EMERGENCY MEDICINE | Admitting: EMERGENCY MEDICINE
Payer: MEDICAID

## 2019-05-01 VITALS
HEART RATE: 97 BPM | OXYGEN SATURATION: 97 % | SYSTOLIC BLOOD PRESSURE: 136 MMHG | RESPIRATION RATE: 18 BRPM | TEMPERATURE: 99 F | DIASTOLIC BLOOD PRESSURE: 94 MMHG

## 2019-05-01 VITALS
HEART RATE: 117 BPM | TEMPERATURE: 98 F | DIASTOLIC BLOOD PRESSURE: 76 MMHG | SYSTOLIC BLOOD PRESSURE: 153 MMHG | RESPIRATION RATE: 16 BRPM | HEIGHT: 74 IN | OXYGEN SATURATION: 99 % | WEIGHT: 220.02 LBS

## 2019-05-01 LAB
ANION GAP SERPL CALC-SCNC: 9 MMOL/L — SIGNIFICANT CHANGE UP (ref 5–17)
BASOPHILS # BLD AUTO: 0.06 K/UL — SIGNIFICANT CHANGE UP (ref 0–0.2)
BASOPHILS NFR BLD AUTO: 0.7 % — SIGNIFICANT CHANGE UP (ref 0–2)
BUN SERPL-MCNC: 18 MG/DL — SIGNIFICANT CHANGE UP (ref 7–23)
CALCIUM SERPL-MCNC: 8.6 MG/DL — SIGNIFICANT CHANGE UP (ref 8.5–10.1)
CHLORIDE SERPL-SCNC: 106 MMOL/L — SIGNIFICANT CHANGE UP (ref 96–108)
CO2 SERPL-SCNC: 25 MMOL/L — SIGNIFICANT CHANGE UP (ref 22–31)
CREAT SERPL-MCNC: 1.1 MG/DL — SIGNIFICANT CHANGE UP (ref 0.5–1.3)
EOSINOPHIL # BLD AUTO: 0.02 K/UL — SIGNIFICANT CHANGE UP (ref 0–0.5)
EOSINOPHIL NFR BLD AUTO: 0.2 % — SIGNIFICANT CHANGE UP (ref 0–6)
GLUCOSE SERPL-MCNC: 114 MG/DL — HIGH (ref 70–99)
HCT VFR BLD CALC: 39 % — SIGNIFICANT CHANGE UP (ref 39–50)
HGB BLD-MCNC: 13.1 G/DL — SIGNIFICANT CHANGE UP (ref 13–17)
IMM GRANULOCYTES NFR BLD AUTO: 0.2 % — SIGNIFICANT CHANGE UP (ref 0–1.5)
LYMPHOCYTES # BLD AUTO: 1.77 K/UL — SIGNIFICANT CHANGE UP (ref 1–3.3)
LYMPHOCYTES # BLD AUTO: 22.1 % — SIGNIFICANT CHANGE UP (ref 13–44)
MCHC RBC-ENTMCNC: 31.6 PG — SIGNIFICANT CHANGE UP (ref 27–34)
MCHC RBC-ENTMCNC: 33.6 GM/DL — SIGNIFICANT CHANGE UP (ref 32–36)
MCV RBC AUTO: 94 FL — SIGNIFICANT CHANGE UP (ref 80–100)
MONOCYTES # BLD AUTO: 0.62 K/UL — SIGNIFICANT CHANGE UP (ref 0–0.9)
MONOCYTES NFR BLD AUTO: 7.7 % — SIGNIFICANT CHANGE UP (ref 2–14)
NEUTROPHILS # BLD AUTO: 5.52 K/UL — SIGNIFICANT CHANGE UP (ref 1.8–7.4)
NEUTROPHILS NFR BLD AUTO: 69.1 % — SIGNIFICANT CHANGE UP (ref 43–77)
NRBC # BLD: 0 /100 WBCS — SIGNIFICANT CHANGE UP (ref 0–0)
PLATELET # BLD AUTO: 266 K/UL — SIGNIFICANT CHANGE UP (ref 150–400)
POTASSIUM SERPL-MCNC: 3.6 MMOL/L — SIGNIFICANT CHANGE UP (ref 3.5–5.3)
POTASSIUM SERPL-SCNC: 3.6 MMOL/L — SIGNIFICANT CHANGE UP (ref 3.5–5.3)
RBC # BLD: 4.15 M/UL — LOW (ref 4.2–5.8)
RBC # FLD: 11.9 % — SIGNIFICANT CHANGE UP (ref 10.3–14.5)
SODIUM SERPL-SCNC: 140 MMOL/L — SIGNIFICANT CHANGE UP (ref 135–145)
WBC # BLD: 8.01 K/UL — SIGNIFICANT CHANGE UP (ref 3.8–10.5)
WBC # FLD AUTO: 8.01 K/UL — SIGNIFICANT CHANGE UP (ref 3.8–10.5)

## 2019-05-01 PROCEDURE — 85027 COMPLETE CBC AUTOMATED: CPT

## 2019-05-01 PROCEDURE — 99284 EMERGENCY DEPT VISIT MOD MDM: CPT | Mod: 25

## 2019-05-01 PROCEDURE — 80048 BASIC METABOLIC PNL TOTAL CA: CPT

## 2019-05-01 PROCEDURE — 36415 COLL VENOUS BLD VENIPUNCTURE: CPT

## 2019-05-01 PROCEDURE — 99283 EMERGENCY DEPT VISIT LOW MDM: CPT

## 2019-05-01 PROCEDURE — 99283 EMERGENCY DEPT VISIT LOW MDM: CPT | Mod: 25

## 2019-05-01 RX ORDER — ACETAMINOPHEN 500 MG
650 TABLET ORAL ONCE
Qty: 0 | Refills: 0 | Status: COMPLETED | OUTPATIENT
Start: 2019-05-01 | End: 2019-05-01

## 2019-05-01 RX ORDER — BACITRACIN ZINC 500 UNIT/G
1 OINTMENT IN PACKET (EA) TOPICAL
Qty: 60 | Refills: 0 | OUTPATIENT
Start: 2019-05-01 | End: 2019-05-10

## 2019-05-01 RX ADMIN — Medication 650 MILLIGRAM(S): at 17:15

## 2019-05-01 NOTE — ED ADULT NURSE NOTE - PMH
Acne    Anxiety    Insomnia Acne    Anxiety    Drug addiction in remission  prescription medications  Insomnia

## 2019-05-01 NOTE — ED ADULT NURSE NOTE - CAS ELECT INFOMATION PROVIDED
UNABLE TO GIVE DISCHARGE PAPERWORK AS PT LEFT BEFORE RECEIVING PAPERS. CHARGE RN SANJUANITA GAUTHIER/Other

## 2019-05-01 NOTE — ED ADULT NURSE NOTE - NSIMPLEMENTINTERV_GEN_ALL_ED
Implemented All Universal Safety Interventions:  [place] to call system. Call bell, personal items and telephone within reach. Instruct patient to call for assistance. Room bathroom lighting operational. Non-slip footwear when patient is off stretcher. Physically safe environment: no spills, clutter or unnecessary equipment. Stretcher in lowest position, wheels locked, appropriate side rails in place.

## 2019-05-01 NOTE — ED ADULT NURSE NOTE - OBJECTIVE STATEMENT
Presents to ER with left sided facial pain secondary to a metal rake hitting him in his face.  Pt was already seen at [facility] where a plastic surgeon sutured multiple wounds. Pt states the sutures opened up on their own and now has open holes on the side of his face. Pt also states one of the lacerations had pus coming out of it, and he cut open the wound by himself and drained it. Pt states he is in so much pain that he needs narcotics.  Denies any fevers or chills.

## 2019-05-01 NOTE — ED PROVIDER NOTE - SKIN AREA #1
lower/2 x 1cm diameter ulcer wound- nt, no erythema, dc c ? swelling. lower/2 x 1cm diameter ulcer wound- nt, no erythema, dc c ? swelling. no fluctuance

## 2019-05-01 NOTE — ED PROVIDER NOTE - OBJECTIVE STATEMENT
left facial holes x 8 days from a metal rake.  pt was seen by [facility] ED physician recommended bacitracin.  pt saw  a plastic surgeon 2 days ago who he couldn't remember the name. ([facility]) in [place].  Here for pain and throbbing x 2 days.  pt took Aleve x 4 in the morning and 2 prior to ED arrival and denied taking any other medication.  Fever 100 around 2000 last night. left facial holes x 8 days from a metal rake.  pt was seen by [facility] ED physician recommended bacitracin.  pt saw  a plastic surgeon 2 days ago who he couldn't remember the name. ([facility]) in [place].  Here for pain and throbbing x 2 days.  pt took Aleve x 4 in the morning and 2 prior to ED arrival and denied taking any other medication.  Fever 100 around 2000 last night.  no other complaint.

## 2019-06-01 ENCOUNTER — OUTPATIENT (OUTPATIENT)
Dept: OUTPATIENT SERVICES | Facility: HOSPITAL | Age: 34
LOS: 1 days | End: 2019-06-01
Payer: MEDICAID

## 2019-06-01 PROCEDURE — G9001: CPT

## 2019-06-06 DIAGNOSIS — Z71.89 OTHER SPECIFIED COUNSELING: ICD-10-CM

## 2019-06-07 PROBLEM — F19.21 OTHER PSYCHOACTIVE SUBSTANCE DEPENDENCE, IN REMISSION: Chronic | Status: ACTIVE | Noted: 2019-05-01

## 2019-10-09 ENCOUNTER — APPOINTMENT (OUTPATIENT)
Dept: NEUROLOGY | Facility: CLINIC | Age: 34
End: 2019-10-09
Payer: MEDICAID

## 2019-10-09 VITALS
DIASTOLIC BLOOD PRESSURE: 102 MMHG | BODY MASS INDEX: 27.98 KG/M2 | SYSTOLIC BLOOD PRESSURE: 151 MMHG | WEIGHT: 225 LBS | HEIGHT: 75 IN | HEART RATE: 114 BPM

## 2019-10-09 VITALS — DIASTOLIC BLOOD PRESSURE: 97 MMHG | HEART RATE: 118 BPM | SYSTOLIC BLOOD PRESSURE: 151 MMHG

## 2019-10-09 DIAGNOSIS — G47.00 INSOMNIA, UNSPECIFIED: ICD-10-CM

## 2019-10-09 DIAGNOSIS — F11.20 OPIOID DEPENDENCE, UNCOMPLICATED: ICD-10-CM

## 2019-10-09 DIAGNOSIS — F41.9 ANXIETY DISORDER, UNSPECIFIED: ICD-10-CM

## 2019-10-09 DIAGNOSIS — G20 PARKINSON'S DISEASE: ICD-10-CM

## 2019-10-09 PROCEDURE — 99205 OFFICE O/P NEW HI 60 MIN: CPT

## 2019-10-09 RX ORDER — PROMETHAZINE HYDROCHLORIDE AND DEXTROMETHORPHAN HYDROBROMIDE ORAL SOLUTION 15; 6.25 MG/5ML; MG/5ML
6.25-15 SOLUTION ORAL
Qty: 240 | Refills: 0 | Status: DISCONTINUED | COMMUNITY
Start: 2018-08-15 | End: 2019-10-09

## 2019-10-09 RX ORDER — AZITHROMYCIN 250 MG/1
250 TABLET, FILM COATED ORAL
Qty: 1 | Refills: 0 | Status: DISCONTINUED | COMMUNITY
Start: 2018-08-15 | End: 2019-10-09

## 2019-10-09 RX ORDER — ALBUTEROL SULFATE 108 UG/1
108 (90 BASE) AEROSOL, METERED RESPIRATORY (INHALATION)
Refills: 0 | Status: DISCONTINUED | COMMUNITY
End: 2019-10-09

## 2019-10-09 RX ORDER — QUETIAPINE FUMARATE 200 MG/1
200 TABLET ORAL
Qty: 90 | Refills: 0 | Status: DISCONTINUED | COMMUNITY
Start: 2018-08-15 | End: 2019-10-09

## 2019-10-09 NOTE — HISTORY OF PRESENT ILLNESS
[FreeTextEntry1] : This is a 33-year-old left-handed male who presents with chief complaints of parkinsonism. At this visit he is accompanied by his mother Milagros. 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 Over time he developed drug addiction - to opiates, and used other drugs such as heroine, cocaine, mushroom, LSD, smoking weed. He was treated with methadone, Suboxone and recently in [facility] was in a rehabilitation program. He has been sober for 4 months.\par \par About 2 months ago he noticed tremors and is left upper extremity which gradually spread to involve the left leg and face. He also notices some tremor on the right side. Reports nighttime drooling. Difficulty swallowing over the last 3 months also reports a dry mouth. Auditory hallucinations. Some visual hallucinations where he may see shadows but his insight is preserved. No falls. His handwriting has become smaller in size. He has constipation. Urinary hesitancy. He talks in his sleep. Still has insomnia. He is taking Remeron 45 mg and amitriptyline 300 mg at bedtime is still having difficulty falling asleep at times and may use some occasional Seroquel. Restless leg still bothers him. He feels that cognitively he is not as sharp and he stumbles on his thoughts. He finds that when he is agitated and nervous his tremor is much more. He was evaluated by a neurologist Dr Sanchez. who diagnosed him with parkinsonism and started him on Mirapex 1.5 mg 3 times a day and gabapentin 800 mg 3 times a day. He finds that his tremor is less with these medications. He denies use of antipsychotic are other anti-dopaminergic medication recently\par \par Review of systems a complete review of systems is performed and is negative except as listed in history of present illness\par \par Past medical history\par Insomnia\par Drug addiction\par Herpes simplex2\par Anxiety\par PTSD\par Hypertension\par \par Family history\par No known neurological conditions\par \par Social history- currently not working, does not drink any alcohol or use any other drugs. Still smokes cigarettes\par \par \par

## 2019-10-09 NOTE — PHYSICAL EXAM
[FreeTextEntry1] : On exam patient is awake and alert. He is pleasant and cooperative with the exam. He is able to give his own history. His tremors are very variable sometimes distractible at times they're so severe that the wheelchair is shaking and the other times the present only in the left upper extremity and left leg.\par Facial expression was minimally reduced. Speech is clear and understandable. There is a scar on the left side of his face. He reports decreased pinprick on the left side of his face. Strength is 5/5. Deep tendon reflexes are reduced in the lower extremities. Plantars are downgoing. He has reduced pinprick sensation in the lower extremities.\par While being examined his tremors are quite severe especially in both lower extremities. Almost grade 4 at times. He has agreed to intermittent resting tremor in the left upper extremity. He has bilateral bradykinesia 2 on the right 3 on the left. Finger taps are one on the right 2 on the left. There is a slight increase in tone in the left upper extremity although the patient does find it very hard to relax. Extraocular movements are intact. Tongue and uvula midline and symmetric. Finger-to-nose is intact. There is also mild postural and intention tremor present.\par No difficulty getting up from the chair. Gait is unremarkable other than reduced bilateral arm swing\par Romberg positive

## 2019-10-14 ENCOUNTER — RX RENEWAL (OUTPATIENT)
Age: 34
End: 2019-10-14

## 2019-10-14 RX ORDER — FLUTICASONE PROPIONATE 50 UG/1
50 SPRAY, METERED NASAL
Qty: 1 | Refills: 0 | Status: DISCONTINUED | COMMUNITY
Start: 2018-08-15 | End: 2019-10-09

## 2019-10-29 ENCOUNTER — OUTPATIENT (OUTPATIENT)
Dept: OUTPATIENT SERVICES | Facility: HOSPITAL | Age: 34
LOS: 1 days | Discharge: HOME | End: 2019-10-29

## 2019-11-06 LAB
ALBUMIN SERPL ELPH-MCNC: 4.5 G/DL
ALP BLD-CCNC: 65 U/L
ALT SERPL-CCNC: 21 U/L
ANION GAP SERPL CALC-SCNC: 13 MMOL/L
AST SERPL-CCNC: 17 U/L
BILIRUB SERPL-MCNC: 0.2 MG/DL
BUN SERPL-MCNC: 21 MG/DL
CALCIUM SERPL-MCNC: 9.4 MG/DL
CERULOPLASMIN SERPL-MCNC: 21 MG/DL
CHLORIDE SERPL-SCNC: 104 MMOL/L
CO2 SERPL-SCNC: 25 MMOL/L
CREAT SERPL-MCNC: 1.02 MG/DL
CRP SERPL-MCNC: 2.49 MG/DL
ERYTHROCYTE [SEDIMENTATION RATE] IN BLOOD BY WESTERGREN METHOD: 4 MM/HR
FERRITIN SERPL-MCNC: 30 NG/ML
FOLATE SERPL-MCNC: 14.6 NG/ML
GLUCOSE SERPL-MCNC: 114 MG/DL
POTASSIUM SERPL-SCNC: 4.6 MMOL/L
PROT SERPL-MCNC: 7.1 G/DL
SODIUM SERPL-SCNC: 142 MMOL/L
TSH SERPL-ACNC: 0.92 UIU/ML
VIT B12 SERPL-MCNC: 390 PG/ML

## 2019-11-07 LAB — RPR SER-TITR: NORMAL

## 2019-11-08 LAB — COPPER SERPL-MCNC: 87 UG/DL

## 2019-11-12 ENCOUNTER — FORM ENCOUNTER (OUTPATIENT)
Age: 34
End: 2019-11-12

## 2019-11-12 LAB — PARANEOPLASTIC AB PNL SER: NORMAL

## 2019-11-13 ENCOUNTER — APPOINTMENT (OUTPATIENT)
Dept: NUCLEAR MEDICINE | Facility: HOSPITAL | Age: 34
End: 2019-11-13
Payer: MEDICAID

## 2019-11-13 ENCOUNTER — OTHER (OUTPATIENT)
Age: 34
End: 2019-11-13

## 2019-11-13 ENCOUNTER — OUTPATIENT (OUTPATIENT)
Dept: OUTPATIENT SERVICES | Facility: HOSPITAL | Age: 34
LOS: 1 days | End: 2019-11-13

## 2019-11-13 DIAGNOSIS — G20 PARKINSON'S DISEASE: ICD-10-CM

## 2019-11-13 PROCEDURE — 78607: CPT | Mod: 26

## 2019-11-21 ENCOUNTER — CHART COPY (OUTPATIENT)
Age: 34
End: 2019-11-21

## 2019-11-21 ENCOUNTER — APPOINTMENT (OUTPATIENT)
Dept: NEUROLOGY | Facility: CLINIC | Age: 34
End: 2019-11-21
Payer: MEDICAID

## 2019-11-21 VITALS
DIASTOLIC BLOOD PRESSURE: 91 MMHG | BODY MASS INDEX: 32.08 KG/M2 | SYSTOLIC BLOOD PRESSURE: 130 MMHG | WEIGHT: 250 LBS | HEIGHT: 74 IN | HEART RATE: 88 BPM | OXYGEN SATURATION: 98 %

## 2019-11-21 VITALS — DIASTOLIC BLOOD PRESSURE: 90 MMHG | SYSTOLIC BLOOD PRESSURE: 136 MMHG

## 2019-11-21 DIAGNOSIS — G25.81 RESTLESS LEGS SYNDROME: ICD-10-CM

## 2019-11-21 PROCEDURE — 99214 OFFICE O/P EST MOD 30 MIN: CPT

## 2019-11-21 NOTE — DISCUSSION/SUMMARY
[FreeTextEntry1] : This is a 33-year-old left-handed male with a complicated medical history involving insomnia, RLS, RBD, drug abuse. He also has history of anxiety and is on amitriptyline, Remeron, pramipexole and gabapentin. He is reporting dry mouth and urinary hesitancy and cognitive changes these could be a side effect of amitriptyline. Also has history of restless leg. His exam was quite variable and distractible at times. Tremor ranged from grade 4 in both legs when he was being examined to almost  minimal visible in the left arm and leg at other times. parkinsonism- differential diagnosis at this time would include idiopathic Parkinson's disease, drug-induced parkinsonism versus psychogenic movement disorder\par Impression - RLS, RBD, insomnia, enhanced physiologic tremor\par Plan\par -Reducing Amitriptyline has been helpful -currently on 25mg qhs from 300 in past- improvement in  dry mouth, cognitive slowing and urinary hesitancy.\par -Takes Melatonin 15 mg at bedtime to help with REM behavior disorder, still has dream enactment - sleep medicine consultation - he is working on it already. also help with insomnia RLS\par -Labs B12, folate, TSH, ferritin, paraneoplastic panel, CMP , serum copper and ceruloplasmin and RPR WNL\par -MRI of the brain with contrast as the patient has history of concussions is reporting numbness on the left side of his face, done at outside facility yesterday, results pending.\par -AALIYAH Scan Normal\par -increase  propranolol to 20mg daily- This may help with the anxiety induced tremor\par -Modified barium swallow as he is reporting difficulty swallowing -not done yet, feels like foods gets caught in the throat in evenings, no difficulty in daytime.\par -Encouraged to follow with primary care physician for elevated CRP, c/o hand pain\par -Psychiatry had a visit yesterday\par -gabapentin 800mg tid, renewed one time till he establishes with other providers (completely out of it)\par - follow up with me as needed\par - reduce dose (in am) of mirapex as tolerated

## 2019-11-21 NOTE — PHYSICAL EXAM
[FreeTextEntry1] : On exam patient is awake and alert. He is pleasant and cooperative with the exam. \par Facial expression normal. Speech is clear and understandable. There is a scar on the left side of his face. He reports decreased pinprick on the left side of his face. Strength is 5/5. Deep tendon reflexes are reduced in the lower extremities. Plantars are downgoing. He has reduced pinprick sensation in the lower extremities.\par \par no resting tremors seen today, mild postural tremors mostly in LUE. no dysmetria, no intention tremor. Hand opening and closing 2 on the left. 2 on the left. normal on R. C/o pain in L hand. Extraocular movements are intact. Tongue and uvula midline and symmetric. Finger-to-nose is intact. \par No difficulty getting up from the chair. gait Normal\par Romberg negative

## 2019-11-21 NOTE — HISTORY OF PRESENT ILLNESS
[FreeTextEntry1] : This is a 33-year-old left-handed male who presents with chief complaints of parkinsonism. At this visit he is accompanied by his mother Milagros. 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 Over time he developed drug addiction - to opiates, and used other drugs such as heroine, cocaine, mushroom, LSD, smoking weed. He was treated with methadone, Suboxone and recently in [facility] was in a rehabilitation program. He has been sober for 4 months.\par \par About 2 months ago he noticed tremors and is left upper extremity which gradually spread to involve the left leg and face. He also notices some tremor on the right side. Reports nighttime drooling. Difficulty swallowing over the last 3 months also reports a dry mouth. Auditory hallucinations. Some visual hallucinations where he may see shadows but his insight is preserved. No falls. His handwriting has become smaller in size. He has constipation. Urinary hesitancy. He talks in his sleep. Still has insomnia. He is taking Remeron 45 mg and amitriptyline 300 mg at bedtime is still having difficulty falling asleep at times and may use some occasional Seroquel. Restless leg still bothers him. He feels that cognitively he is not as sharp and he stumbles on his thoughts. He finds that when he is agitated and nervous his tremor is much more. He was evaluated by a neurologist Dr Sanchez. who diagnosed him with parkinsonism and started him on Mirapex 1.5 mg 3 times a day and gabapentin 800 mg 3 times a day. He finds that his tremor is less with these medications. He denies use of antipsychotic are other anti-dopaminergic medication recently\par \par Review of systems a complete review of systems is performed and is negative except as listed in history of present illness\par \par Past medical history\par Insomnia\par Drug addiction\par Herpes simplex2\par Anxiety\par PTSD\par Hypertension\par \par Family history\par No known neurological conditions\par \par Social history- currently not working, does not drink any alcohol or use any other drugs. Still smokes cigarettes\par \par Interval history:: Patient presents along with his mother today. He has cut down significantly on amitriptyline from 300 to 25 mg at bedtime. He finds that dryness of the mouth is much less, cognitively doing better. Tremors have been much less. Still has REM behavior disorder. States that he takes about 15-20 mg of melatonin. Reports he has restless leg which is usually worse in the evening or night. States that his girlfriend has seen him move his legs a lot at night is not always aware of it MRI of the brain was done yesterday the results are pending. AALIYAH scan has been negative. Most of the workup has been negative except for CRP was elevated. takes propranolol 10mg no side effects\par

## 2019-12-10 DIAGNOSIS — F11.10 OPIOID ABUSE, UNCOMPLICATED: ICD-10-CM

## 2019-12-10 DIAGNOSIS — F14.10 COCAINE ABUSE, UNCOMPLICATED: ICD-10-CM

## 2020-01-03 ENCOUNTER — APPOINTMENT (OUTPATIENT)
Dept: PULMONOLOGY | Facility: CLINIC | Age: 35
End: 2020-01-03
Payer: MEDICAID

## 2020-01-03 VITALS
HEIGHT: 74 IN | TEMPERATURE: 97.6 F | BODY MASS INDEX: 34.27 KG/M2 | DIASTOLIC BLOOD PRESSURE: 87 MMHG | SYSTOLIC BLOOD PRESSURE: 136 MMHG | WEIGHT: 267 LBS | HEART RATE: 82 BPM | RESPIRATION RATE: 16 BRPM

## 2020-01-03 DIAGNOSIS — R06.83 SNORING: ICD-10-CM

## 2020-01-03 DIAGNOSIS — I10 ESSENTIAL (PRIMARY) HYPERTENSION: ICD-10-CM

## 2020-01-03 PROCEDURE — 99204 OFFICE O/P NEW MOD 45 MIN: CPT

## 2020-01-03 RX ORDER — QUETIAPINE 100 MG/1
100 TABLET, FILM COATED ORAL
Refills: 0 | Status: ACTIVE | COMMUNITY
Start: 2020-01-03

## 2020-01-03 RX ORDER — AMITRIPTYLINE HYDROCHLORIDE 25 MG/1
25 TABLET, FILM COATED ORAL
Qty: 30 | Refills: 3 | Status: DISCONTINUED | COMMUNITY
Start: 2019-10-09 | End: 2020-01-03

## 2020-01-03 RX ORDER — AMITRIPTYLINE HYDROCHLORIDE 100 MG/1
100 TABLET, FILM COATED ORAL
Qty: 30 | Refills: 3 | Status: DISCONTINUED | COMMUNITY
Start: 2019-10-09 | End: 2020-01-03

## 2020-01-03 RX ORDER — VALACYCLOVIR 500 MG/1
500 TABLET, FILM COATED ORAL DAILY
Qty: 90 | Refills: 0 | Status: DISCONTINUED | COMMUNITY
Start: 2018-06-25 | End: 2020-01-03

## 2020-01-03 RX ORDER — GABAPENTIN 800 MG/1
800 TABLET, COATED ORAL 3 TIMES DAILY
Qty: 90 | Refills: 1 | Status: DISCONTINUED | COMMUNITY
Start: 2019-10-09 | End: 2020-01-03

## 2020-01-03 RX ORDER — PRAMIPEXOLE DIHYDROCHLORIDE 1 MG/1
1 TABLET ORAL DAILY
Qty: 30 | Refills: 3 | Status: DISCONTINUED | COMMUNITY
Start: 2019-12-02 | End: 2020-01-03

## 2020-01-03 RX ORDER — PRAMIPEXOLE DIHYDROCHLORIDE 0.25 MG/1
0.25 TABLET ORAL DAILY
Qty: 30 | Refills: 3 | Status: DISCONTINUED | COMMUNITY
Start: 2019-12-02 | End: 2020-01-03

## 2020-01-03 RX ORDER — PROPRANOLOL HYDROCHLORIDE 20 MG/1
20 TABLET ORAL DAILY
Qty: 90 | Refills: 3 | Status: DISCONTINUED | COMMUNITY
Start: 2019-10-10 | End: 2020-01-03

## 2020-01-03 RX ORDER — PRAMIPEXOLE DIHYDROCHLORIDE 1.5 MG/1
1.5 TABLET ORAL
Qty: 90 | Refills: 3 | Status: DISCONTINUED | COMMUNITY
Start: 2019-10-09 | End: 2020-01-03

## 2020-03-12 ENCOUNTER — LABORATORY RESULT (OUTPATIENT)
Age: 35
End: 2020-03-12

## 2020-03-12 ENCOUNTER — APPOINTMENT (OUTPATIENT)
Dept: ENDOCRINOLOGY | Facility: CLINIC | Age: 35
End: 2020-03-12
Payer: MEDICAID

## 2020-03-12 VITALS
SYSTOLIC BLOOD PRESSURE: 142 MMHG | OXYGEN SATURATION: 95 % | BODY MASS INDEX: 34.39 KG/M2 | WEIGHT: 268 LBS | DIASTOLIC BLOOD PRESSURE: 96 MMHG | HEART RATE: 99 BPM | HEIGHT: 74 IN

## 2020-03-12 DIAGNOSIS — Z87.891 PERSONAL HISTORY OF NICOTINE DEPENDENCE: ICD-10-CM

## 2020-03-12 DIAGNOSIS — Z78.9 OTHER SPECIFIED HEALTH STATUS: ICD-10-CM

## 2020-03-12 PROCEDURE — 99204 OFFICE O/P NEW MOD 45 MIN: CPT | Mod: 25

## 2020-03-12 PROCEDURE — 36415 COLL VENOUS BLD VENIPUNCTURE: CPT

## 2020-03-13 PROBLEM — Z78.9 DOES NOT USE ILLICIT DRUGS: Status: ACTIVE | Noted: 2017-04-17

## 2020-03-13 PROBLEM — Z78.9 RARELY CONSUMES ALCOHOL: Status: ACTIVE | Noted: 2017-04-17

## 2020-03-13 NOTE — CONSULT LETTER
[Dear  ___] : Dear  [unfilled], [Consult Letter:] : I had the pleasure of evaluating your patient, [unfilled]. [Please see my note below.] : Please see my note below. [Consult Closing:] : Thank you very much for allowing me to participate in the care of this patient.  If you have any questions, please do not hesitate to contact me. [Sincerely,] : Sincerely, [FreeTextEntry3] : Willow Garnett MS. DO.\par Endocrinology, Diabetes and Metabolism\par [address] [place] [address]\[place] [address]\par Tel [phone]\par Fax [phone]\par

## 2020-03-13 NOTE — HISTORY OF PRESENT ILLNESS
[FreeTextEntry1] : Mr Steve Small with past medical history medication induced parkinsonism now off therapy  presents for evaluation of excessive weight gain. He has gained 78 lbs in 4-5 months. He has not been eating excessively. He has tried to increase his activity and decrease eating high calorie foods. He was able to maintain a certain weight most of his life but not recently. He has fatigue, joint pain in the hand, focusing problems. But he denies any GI symptoms, headache, change in his appearance, polyuria, blurry vision. He used to be on a lot of medications for his neurological problems but he is only on seroquel and excedrin now.

## 2020-03-13 NOTE — PHYSICAL EXAM
[Alert] : alert [No Acute Distress] : no acute distress [Well Nourished] : well nourished [Well Developed] : well developed [Normal Sclera/Conjunctiva] : normal sclera/conjunctiva [EOMI] : extra ocular movement intact [No Proptosis] : no proptosis [Normal Oropharynx] : the oropharynx was normal [Thyroid Not Enlarged] : the thyroid was not enlarged [No Thyroid Nodules] : there were no palpable thyroid nodules [No Respiratory Distress] : no respiratory distress [No Accessory Muscle Use] : no accessory muscle use [Clear to Auscultation] : lungs were clear to auscultation bilaterally [Normal Rate] : heart rate was normal  [Normal S1, S2] : normal S1 and S2 [Regular Rhythm] : with a regular rhythm [Pedal Pulses Normal] : the pedal pulses are present [No Edema] : there was no peripheral edema [Normal Bowel Sounds] : normal bowel sounds [Not Tender] : non-tender [Soft] : abdomen soft [Not Distended] : not distended [Post Cervical Nodes] : posterior cervical nodes [Anterior Cervical Nodes] : anterior cervical nodes [Axillary Nodes] : axillary nodes [Normal] : normal and non tender [No Spinal Tenderness] : no spinal tenderness [Spine Straight] : spine straight [Normal Gait] : normal gait [Normal Strength/Tone] : muscle strength and tone were normal [Abdominal Striae] : abdominal striae [Acanthosis Nigricans] : no acanthosis nigricans [Normal Reflexes] : deep tendon reflexes were 2+ and symmetric [No Tremors] : no tremors [Oriented x3] : oriented to person, place, and time

## 2020-03-13 NOTE — ASSESSMENT
[FreeTextEntry1] : Will do an evaluation of secondary etiologies of weight gain\par Will check his thyroid and some pituitary hormones\par Will also do DST to rule out cushings\par I also counseled patient on increasing more activities. 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

## 2020-03-26 ENCOUNTER — NON-APPOINTMENT (OUTPATIENT)
Age: 35
End: 2020-03-26

## 2020-03-27 DIAGNOSIS — E66.9 OBESITY, UNSPECIFIED: ICD-10-CM

## 2020-04-06 LAB
CORTIS SERPL-MCNC: 12.3 UG/DL
DEXAMETHASONE LEVEL: NORMAL
IGF-1 INTERP: NORMAL
IGF-I BLD-MCNC: 197 NG/ML
PROLACTIN SERPL-MCNC: 3.4 NG/ML
T3 SERPL-MCNC: 120 NG/DL
T4 FREE SERPL-MCNC: 1 NG/DL
TSH SERPL-ACNC: 0.38 UIU/ML

## 2020-12-16 PROBLEM — Z87.09 HISTORY OF UPPER RESPIRATORY INFECTION: Status: RESOLVED | Noted: 2018-08-15 | Resolved: 2020-12-16

## 2020-12-23 ENCOUNTER — TRANSCRIPTION ENCOUNTER (OUTPATIENT)
Age: 35
End: 2020-12-23

## 2021-05-03 ENCOUNTER — APPOINTMENT (OUTPATIENT)
Dept: SURGERY | Facility: CLINIC | Age: 36
End: 2021-05-03
Payer: MEDICAID

## 2021-05-03 VITALS
DIASTOLIC BLOOD PRESSURE: 90 MMHG | WEIGHT: 202 LBS | TEMPERATURE: 97.2 F | HEIGHT: 74 IN | SYSTOLIC BLOOD PRESSURE: 131 MMHG | BODY MASS INDEX: 25.93 KG/M2 | HEART RATE: 90 BPM

## 2021-05-03 DIAGNOSIS — R10.32 RIGHT LOWER QUADRANT PAIN: ICD-10-CM

## 2021-05-03 DIAGNOSIS — R10.31 RIGHT LOWER QUADRANT PAIN: ICD-10-CM

## 2021-05-03 PROCEDURE — 99203 OFFICE O/P NEW LOW 30 MIN: CPT

## 2021-05-03 PROCEDURE — 99072 ADDL SUPL MATRL&STAF TM PHE: CPT

## 2021-05-03 NOTE — PHYSICAL EXAM
[Respiratory Effort] : normal respiratory effort [Alert] : alert [Oriented to Person] : oriented to person [Oriented to Place] : oriented to place [Oriented to Time] : oriented to time [Calm] : calm [JVD] : no jugular venous distention  [Abdominal Masses] : No abdominal masses [de-identified] : RUKHSANA AMOR NAD [de-identified] : EOMI [de-identified] : soft NT ND No obvious LIH appreciated and possible small RIH vs lipoma on the right

## 2021-05-03 NOTE — HISTORY OF PRESENT ILLNESS
[de-identified] : 34 yo male presents for evaluation of a possible groin hernia. He states he was recently evaluated by ortho who told him to see general surgery for possible hernia. He describes severe left groin pain which shoots down his leg and gives him some tingling of his toes. His right groin pain seems to cause him left leg weakness. He denies any nausea or vomiting. He states he had xrays done and is scheduled fro MRI

## 2021-05-11 ENCOUNTER — APPOINTMENT (OUTPATIENT)
Dept: ORTHOPEDIC SURGERY | Facility: CLINIC | Age: 36
End: 2021-05-11
Payer: MEDICAID

## 2021-05-11 VITALS — HEIGHT: 74 IN | BODY MASS INDEX: 26.95 KG/M2 | WEIGHT: 210 LBS

## 2021-05-11 DIAGNOSIS — M51.36 OTHER INTERVERTEBRAL DISC DEGENERATION, LUMBAR REGION: ICD-10-CM

## 2021-05-11 PROCEDURE — 99072 ADDL SUPL MATRL&STAF TM PHE: CPT

## 2021-05-11 PROCEDURE — 99204 OFFICE O/P NEW MOD 45 MIN: CPT

## 2021-05-11 PROCEDURE — 72100 X-RAY EXAM L-S SPINE 2/3 VWS: CPT

## 2021-10-06 PROBLEM — I10 ESSENTIAL HYPERTENSION: Status: ACTIVE | Noted: 2020-01-03

## 2021-11-04 ENCOUNTER — APPOINTMENT (OUTPATIENT)
Dept: GASTROENTEROLOGY | Facility: CLINIC | Age: 36
End: 2021-11-04
Payer: MEDICAID

## 2021-11-04 VITALS
BODY MASS INDEX: 25.41 KG/M2 | WEIGHT: 198 LBS | DIASTOLIC BLOOD PRESSURE: 87 MMHG | HEIGHT: 74 IN | SYSTOLIC BLOOD PRESSURE: 132 MMHG | HEART RATE: 88 BPM | OXYGEN SATURATION: 97 %

## 2021-11-04 DIAGNOSIS — K58.9 IRRITABLE BOWEL SYNDROME W/OUT DIARRHEA: ICD-10-CM

## 2021-11-04 DIAGNOSIS — K52.9 NONINFECTIVE GASTROENTERITIS AND COLITIS, UNSPECIFIED: ICD-10-CM

## 2021-11-04 DIAGNOSIS — K62.5 HEMORRHAGE OF ANUS AND RECTUM: ICD-10-CM

## 2021-11-04 PROCEDURE — 99204 OFFICE O/P NEW MOD 45 MIN: CPT

## 2021-11-04 RX ORDER — DEXAMETHASONE 1 MG/1
1 TABLET ORAL
Qty: 1 | Refills: 0 | Status: DISCONTINUED | COMMUNITY
Start: 2020-03-12 | End: 2021-11-04

## 2021-11-04 RX ORDER — AMLODIPINE BESYLATE 5 MG/1
5 TABLET ORAL
Refills: 0 | Status: DISCONTINUED | COMMUNITY
Start: 2020-01-03 | End: 2021-11-04

## 2021-11-04 RX ORDER — MELOXICAM 15 MG/1
15 TABLET ORAL
Qty: 30 | Refills: 1 | Status: DISCONTINUED | COMMUNITY
Start: 2021-05-11 | End: 2021-11-04

## 2021-11-04 RX ORDER — METFORMIN HYDROCHLORIDE 500 MG/1
500 TABLET, COATED ORAL
Qty: 60 | Refills: 3 | Status: DISCONTINUED | COMMUNITY
Start: 2020-03-27 | End: 2021-11-04

## 2021-11-04 NOTE — ASSESSMENT
[FreeTextEntry1] : My impression is that of a male who has frequent postprandial diarrhea and epigastric discomfort.  Need to rule out IBD, celiac sprue and to evaluate perianal pressure.\par \par I have asked the patient to schedule a colonoscopy in the near future. I have reviewed the risks benefits and alternatives and provided the patient literature to read.  I have emphasized the need to have a good clean out including adequate fluid intake and avoiding seeds for one week prior to the procedure.\par \par I have spent a great deal of time discussing the role of daily high-intensity exercise with the patient. We discussed behavior modification strategies to institute this habit.   I have discussed nutrition in great detail including consuming vegetable fibers on a daily basis and limiting simple carbohydrates 5 days per week.  We have also reviewed the benefits of soluble fiber supplementation, including (but not limited to), favorable effects on lipid profile, weight control and the salutary effects on colonic microbiota. We reviewed the effects of such daily habits on metabolism and the metabolic set point resulting in a healthy weight, decreased pain sensitivity (effecting the GI tract), bowel habits and other aspects of health.  Mindful meditation practice recommended.\par \par

## 2021-11-04 NOTE — REVIEW OF SYSTEMS
[Fever] : no fever [As Noted in HPI] : as noted in HPI [Dry Skin] : no dry skin [Confused] : no confusion [Sleep Disturbances] : sleep disturbances [Negative] : Heme/Lymph [de-identified] : OCD,

## 2021-11-04 NOTE — PHYSICAL EXAM
[General Appearance - Alert] : alert [General Appearance - In No Acute Distress] : in no acute distress [Sclera] : the sclera and conjunctiva were normal [PERRL With Normal Accommodation] : pupils were equal in size, round, and reactive to light [Extraocular Movements] : extraocular movements were intact [Outer Ear] : the ears and nose were normal in appearance [Oropharynx] : the oropharynx was normal [Neck Appearance] : the appearance of the neck was normal [Neck Cervical Mass (___cm)] : no neck mass was observed [Jugular Venous Distention Increased] : there was no jugular-venous distention [Thyroid Diffuse Enlargement] : the thyroid was not enlarged [Thyroid Nodule] : there were no palpable thyroid nodules [Auscultation Breath Sounds / Voice Sounds] : lungs were clear to auscultation bilaterally [Heart Rate And Rhythm] : heart rate was normal and rhythm regular [Heart Sounds] : normal S1 and S2 [Heart Sounds Gallop] : no gallops [Murmurs] : no murmurs [Heart Sounds Pericardial Friction Rub] : no pericardial rub [Edema] : there was no peripheral edema [Bowel Sounds] : normal bowel sounds [Abdomen Soft] : soft [Abdomen Tenderness] : non-tender [] : no hepato-splenomegaly [Abdomen Mass (___ Cm)] : no abdominal mass palpated [Cervical Lymph Nodes Enlarged Posterior Bilaterally] : posterior cervical [Cervical Lymph Nodes Enlarged Anterior Bilaterally] : anterior cervical [No CVA Tenderness] : no ~M costovertebral angle tenderness [No Spinal Tenderness] : no spinal tenderness [Skin Color & Pigmentation] : normal skin color and pigmentation [No Focal Deficits] : no focal deficits [Oriented To Time, Place, And Person] : oriented to person, place, and time [Impaired Insight] : insight and judgment were intact [Affect] : the affect was normal

## 2021-11-04 NOTE — HISTORY OF PRESENT ILLNESS
[de-identified] : The patient reports that he frequently has loose stools and has developed perirectal pressure.  Sometimes gets abdominal discomfort prior to his diarrhea which happens nearly daily.  He is unfamiliar with his family history.  He has been on multiple medications for OCD in the past but now off.  There are some insomnia.  He is currently not exercising at all.  He denies classic heartburn, odynophagia, dysphagia or satiety.  His weight has stable.

## 2021-12-14 ENCOUNTER — APPOINTMENT (OUTPATIENT)
Dept: ORTHOPEDIC SURGERY | Facility: CLINIC | Age: 36
End: 2021-12-14

## 2021-12-20 ENCOUNTER — APPOINTMENT (OUTPATIENT)
Dept: ORTHOPEDIC SURGERY | Facility: CLINIC | Age: 36
End: 2021-12-20

## 2022-02-27 ENCOUNTER — TRANSCRIPTION ENCOUNTER (OUTPATIENT)
Age: 37
End: 2022-02-27

## 2022-02-28 ENCOUNTER — APPOINTMENT (OUTPATIENT)
Dept: GASTROENTEROLOGY | Facility: HOSPITAL | Age: 37
End: 2022-02-28

## 2022-02-28 ENCOUNTER — OUTPATIENT (OUTPATIENT)
Dept: OUTPATIENT SERVICES | Facility: HOSPITAL | Age: 37
LOS: 1 days | End: 2022-02-28
Payer: MEDICAID

## 2022-02-28 ENCOUNTER — RESULT REVIEW (OUTPATIENT)
Age: 37
End: 2022-02-28

## 2022-02-28 DIAGNOSIS — K52.9 NONINFECTIVE GASTROENTERITIS AND COLITIS, UNSPECIFIED: ICD-10-CM

## 2022-02-28 DIAGNOSIS — K62.5 HEMORRHAGE OF ANUS AND RECTUM: ICD-10-CM

## 2022-02-28 PROCEDURE — 43239 EGD BIOPSY SINGLE/MULTIPLE: CPT

## 2022-02-28 PROCEDURE — 88305 TISSUE EXAM BY PATHOLOGIST: CPT | Mod: 26

## 2022-02-28 PROCEDURE — 88313 SPECIAL STAINS GROUP 2: CPT

## 2022-02-28 PROCEDURE — 88313 SPECIAL STAINS GROUP 2: CPT | Mod: 26

## 2022-02-28 PROCEDURE — 88312 SPECIAL STAINS GROUP 1: CPT | Mod: 26

## 2022-02-28 PROCEDURE — 88305 TISSUE EXAM BY PATHOLOGIST: CPT

## 2022-02-28 PROCEDURE — 43239 EGD BIOPSY SINGLE/MULTIPLE: CPT | Mod: 59

## 2022-02-28 PROCEDURE — 88312 SPECIAL STAINS GROUP 1: CPT

## 2022-02-28 PROCEDURE — 45380 COLONOSCOPY AND BIOPSY: CPT

## 2022-02-28 DEVICE — CLIP RESOLUTION 360 235CM: Type: IMPLANTABLE DEVICE | Status: FUNCTIONAL

## 2022-02-28 DEVICE — ESOPHAGEAL BALLOON CATH CRE FIXED WIRE 6-7-8MM: Type: IMPLANTABLE DEVICE | Status: FUNCTIONAL

## 2022-02-28 DEVICE — HEMOSPRAY HEMOSTAT ENDO 7F: Type: IMPLANTABLE DEVICE | Status: FUNCTIONAL

## 2022-03-02 LAB — SURGICAL PATHOLOGY STUDY: SIGNIFICANT CHANGE UP

## 2022-03-10 ENCOUNTER — APPOINTMENT (OUTPATIENT)
Dept: COLORECTAL SURGERY | Facility: CLINIC | Age: 37
End: 2022-03-10
Payer: MEDICAID

## 2022-03-10 VITALS
HEART RATE: 64 BPM | WEIGHT: 13.75 LBS | SYSTOLIC BLOOD PRESSURE: 127 MMHG | DIASTOLIC BLOOD PRESSURE: 83 MMHG | BODY MASS INDEX: 1.77 KG/M2 | HEIGHT: 74 IN

## 2022-03-10 PROCEDURE — 99203 OFFICE O/P NEW LOW 30 MIN: CPT

## 2022-03-10 RX ORDER — SODIUM SULFATE, POTASSIUM SULFATE, MAGNESIUM SULFATE 17.5; 3.13; 1.6 G/ML; G/ML; G/ML
17.5-3.13-1.6 SOLUTION, CONCENTRATE ORAL
Qty: 1 | Refills: 0 | Status: COMPLETED | COMMUNITY
Start: 2021-11-04 | End: 2022-03-10

## 2022-03-10 NOTE — HISTORY OF PRESENT ILLNESS
[FreeTextEntry1] : 36-year-old male presents for consultation for an external hemorrhoid.  It has been there for quite a while but he describes waxing and waning in the size of the lesion.  It causes itching and irritation but no rectal bleeding.  He has a history of diarrhea and was recently worked up with both upper and lower endoscopy by Dr. Mclean which was unremarkable.  No associated fevers or chills.

## 2022-03-10 NOTE — PHYSICAL EXAM
[Respiratory Effort] : normal respiratory effort [Normal Rate and Rhythm] : normal rate and rhythm [Calm] : calm [de-identified] : Soft, nontender, nondistended.  No mass or hernias appreciated. [de-identified] : Right posterior 2.5 cm external hemorrhoid skin tag which is inflamed.  May represent an external opening of an anal fistula.  No surrounding erythema or fluctuance.  Normal APRIL.  Anoscopy deferred. [de-identified] : Well-appearing, in no distress [de-identified] : Normocephalic, atraumatic [de-identified] : Moves extremities without difficulty [de-identified] : Warm and dry [de-identified] : Alert and oriented x3

## 2022-03-10 NOTE — CONSULT LETTER
[Dear  ___] : Dear  [unfilled], [Consult Letter:] : I had the pleasure of evaluating your patient, [unfilled]. [Please see my note below.] : Please see my note below. [Consult Closing:] : Thank you very much for allowing me to participate in the care of this patient.  If you have any questions, please do not hesitate to contact me. [Sincerely,] : Sincerely, [FreeTextEntry1] : I plan to excise the external hemorrhoid lesion and assess for the presence of a fistula at the time of surgery. If present, would perform a fistulotomy concurrently.  [FreeTextEntry3] : Erik Horn MD\par

## 2022-03-23 ENCOUNTER — NON-APPOINTMENT (OUTPATIENT)
Age: 37
End: 2022-03-23

## 2022-05-22 ENCOUNTER — NON-APPOINTMENT (OUTPATIENT)
Age: 37
End: 2022-05-22

## 2022-08-14 ENCOUNTER — NON-APPOINTMENT (OUTPATIENT)
Age: 37
End: 2022-08-14

## 2022-12-22 NOTE — ED PROVIDER NOTE - CPE EDP SKIN NORM
Impression: Systemic lupus erythematosus, organ or system involvement unspecified Plan: Continue care with Dr. Jimmie Dickens. WOUNDS

## 2022-12-28 ENCOUNTER — APPOINTMENT (OUTPATIENT)
Dept: ORTHOPEDIC SURGERY | Facility: CLINIC | Age: 37
End: 2022-12-28

## 2022-12-28 VITALS — BODY MASS INDEX: 26.95 KG/M2 | WEIGHT: 210 LBS | HEIGHT: 74 IN

## 2022-12-28 PROCEDURE — 99203 OFFICE O/P NEW LOW 30 MIN: CPT

## 2022-12-28 PROCEDURE — 72100 X-RAY EXAM L-S SPINE 2/3 VWS: CPT

## 2022-12-28 PROCEDURE — 72170 X-RAY EXAM OF PELVIS: CPT

## 2022-12-28 RX ORDER — TRAMADOL HYDROCHLORIDE 50 MG/1
50 TABLET, COATED ORAL 4 TIMES DAILY
Qty: 5 | Refills: 0 | Status: ACTIVE | COMMUNITY
Start: 2022-12-28 | End: 1900-01-01

## 2022-12-28 RX ORDER — MELOXICAM 15 MG/1
15 TABLET ORAL DAILY
Qty: 30 | Refills: 1 | Status: ACTIVE | COMMUNITY
Start: 2022-12-28 | End: 1900-01-01

## 2022-12-28 RX ORDER — METHYLPREDNISOLONE 4 MG/1
4 TABLET ORAL
Qty: 1 | Refills: 0 | Status: ACTIVE | COMMUNITY
Start: 2022-12-28 | End: 1900-01-01

## 2022-12-28 RX ORDER — CYCLOBENZAPRINE HYDROCHLORIDE 10 MG/1
10 TABLET, FILM COATED ORAL 3 TIMES DAILY
Qty: 30 | Refills: 1 | Status: ACTIVE | COMMUNITY
Start: 2022-12-28

## 2022-12-28 NOTE — IMAGING
[de-identified] : PE lumbar spine: +tenderness to bilateral paraspinals (R>L), no midline tenderness, limited motion due to pain, able to SLR with pain, motor and sensory intact, NVI\par  [No bony abnormalities] : No bony abnormalities [AP] : anteroposterior [There are no fractures, subluxations or dislocations. No significant abnormalities are seen] : There are no fractures, subluxations or dislocations. No significant abnormalities are seen

## 2022-12-28 NOTE — HISTORY OF PRESENT ILLNESS
[Gradual] : gradual [10] : 10 [9] : 9 [de-identified] : 38 y/o M with atraumatic low back pain x 1 week. Radiation of pain down buttock. Denies numbness/tingling. denies bladder or bowel issues. He has tried motrin/tylenol without relief.\par denies DM.\par  [FreeTextEntry5] : pt c.o pain in low back for a week, pt states no specific injury

## 2022-12-28 NOTE — ASSESSMENT
[FreeTextEntry1] : A/P LBP without radiculopathy\par - medrol dose bertha\par - muscle relaxant\par - NSAIDS\par - tramadol\par - f/u spine\par

## 2022-12-30 ENCOUNTER — APPOINTMENT (OUTPATIENT)
Dept: COLORECTAL SURGERY | Facility: CLINIC | Age: 37
End: 2022-12-30
Payer: MEDICAID

## 2022-12-30 VITALS
BODY MASS INDEX: 26.95 KG/M2 | HEIGHT: 74 IN | SYSTOLIC BLOOD PRESSURE: 151 MMHG | DIASTOLIC BLOOD PRESSURE: 93 MMHG | WEIGHT: 210 LBS

## 2022-12-30 DIAGNOSIS — K64.9 UNSPECIFIED HEMORRHOIDS: ICD-10-CM

## 2022-12-30 DIAGNOSIS — K60.3 ANAL FISTULA: ICD-10-CM

## 2022-12-30 PROCEDURE — 99213 OFFICE O/P EST LOW 20 MIN: CPT

## 2022-12-30 NOTE — HISTORY OF PRESENT ILLNESS
[FreeTextEntry1] : 37-year-old male who presents for a follow-up visit.  He has an external hemorrhoid and also findings concerning for a fistula which was evaluated earlier this year.  He is now ready to pursue surgery because his work schedule can accommodate it.  He continues to have intermittent purulent drainage, pain and bleeding from this area.  \par

## 2022-12-30 NOTE — PHYSICAL EXAM
[Patient Refused] : exam was refused by the patient [Respiratory Effort] : normal respiratory effort [Calm] : calm [de-identified] : Well-appearing, in no distress [de-identified] : Normocephalic, atraumatic [de-identified] : Moves extremities without difficulty [de-identified] : Warm and dry [de-identified] : Alert and oriented x3

## 2023-01-05 ENCOUNTER — APPOINTMENT (OUTPATIENT)
Dept: PAIN MANAGEMENT | Facility: CLINIC | Age: 38
End: 2023-01-05
Payer: MEDICAID

## 2023-01-05 VITALS — HEIGHT: 74 IN | WEIGHT: 210 LBS | BODY MASS INDEX: 26.95 KG/M2

## 2023-01-05 DIAGNOSIS — M79.10 MYALGIA, UNSPECIFIED SITE: ICD-10-CM

## 2023-01-05 DIAGNOSIS — M54.17 RADICULOPATHY, LUMBOSACRAL REGION: ICD-10-CM

## 2023-01-05 DIAGNOSIS — M54.50 LOW BACK PAIN, UNSPECIFIED: ICD-10-CM

## 2023-01-05 PROCEDURE — 99203 OFFICE O/P NEW LOW 30 MIN: CPT

## 2023-01-05 NOTE — HISTORY OF PRESENT ILLNESS
[Lower back] : lower back [9] : 9 [8] : 8 [Sharp] : sharp [Constant] : constant [Rest] : rest [Meds] : meds [Standing] : standing [Walking] : walking [FreeTextEntry1] : Initial HPI 01/05/2023:\par Pain started a few weeks ago and is on the LEFT side of the lower back and radiates into the left buttock described as a sharp/burning pain with no associated numbness and tingling. \par \par MRI Lumbar Spine 6/8/16\par Conservative Care: None\par Pain Medications: Cyclobenzaprine, tramadol, mobic and now taking MDP. \par Past Injections: none\par Spine surgery: none \par Blood thinners: none\par  [] : no [FreeTextEntry9] : laying down

## 2023-01-05 NOTE — DISCUSSION/SUMMARY
[de-identified] : After discussing various treatment options with the patient including but not limited to oral medications, physical therapy, exercise, as well as interventional spinal injections, we have decided with the following plan:\par \par - Continue home exercises, stretching, activity modification, physical therapy, and conservative care.\par - Will order lumbar MRI to rule out HNP. Please let this note serve as a formal request for authorization to perform a MRI of their Lumbar Spine.\par - Follow-up post diagnostic imaging to review and discuss further treatment.\par - Complete MDP and once finished can starte Mobic PRN. \par - Recommend continue Cyclobenzaprine 10mg BID PRN for muscle spasms and to assist with pain relief.\par - Will provide prescription for Physical Therapy.\par

## 2023-01-05 NOTE — PHYSICAL EXAM
[de-identified] : Constitutional; Appears well, no apparent distress\par Ability to communicate: Normal \par Respiratory: non-labored breathing\par Skin: No rash noted\par Head: Normocephalic, atraumatic\par Neck: no visible thyroid enlargement\par Eyes: Extraocular movements intact\par Neurologic: Alert and oriented x3\par Psychiatric: normal mood, affect and behavior \par \par  [Flexion] : flexion [] : mildly antalgic

## 2023-01-09 ENCOUNTER — NON-APPOINTMENT (OUTPATIENT)
Age: 38
End: 2023-01-09

## 2023-02-23 ENCOUNTER — APPOINTMENT (OUTPATIENT)
Dept: ORTHOPEDIC SURGERY | Facility: CLINIC | Age: 38
End: 2023-02-23
Payer: MEDICAID

## 2023-02-23 VITALS — BODY MASS INDEX: 26.95 KG/M2 | HEIGHT: 74 IN | WEIGHT: 210 LBS

## 2023-02-23 PROCEDURE — 73080 X-RAY EXAM OF ELBOW: CPT | Mod: RT

## 2023-02-23 PROCEDURE — 99213 OFFICE O/P EST LOW 20 MIN: CPT

## 2023-02-23 NOTE — ASSESSMENT
[FreeTextEntry1] : Has traumatic lateral epicondylitis. Will start him on PT for stretching/strengthening

## 2023-02-23 NOTE — PHYSICAL EXAM
[NL (150)] : flexion 150 degrees [NL (0)] : extension 0 degrees [NL (90)] : supination 90 degrees [] : lateral elbow pain with resisted wrist extension [Right] : right elbow [There are no fractures, subluxations or dislocations. No significant abnormalities are seen] : There are no fractures, subluxations or dislocations. No significant abnormalities are seen

## 2023-02-23 NOTE — HISTORY OF PRESENT ILLNESS
[7] : 7 [Dull/Aching] : dull/aching [Throbbing] : throbbing [Intermittent] : intermittent [Nothing helps with pain getting better] : Nothing helps with pain getting better [de-identified] : RT elbow injury 2 weeks ago. Pt has a truck door slammed into his elbow by the wind. pt has had persistent pain since then.  [] : no [FreeTextEntry1] : RT Elbow

## 2023-03-08 ENCOUNTER — FORM ENCOUNTER (OUTPATIENT)
Age: 38
End: 2023-03-08

## 2023-03-31 ENCOUNTER — APPOINTMENT (OUTPATIENT)
Dept: ORTHOPEDIC SURGERY | Facility: CLINIC | Age: 38
End: 2023-03-31
Payer: MEDICAID

## 2023-03-31 VITALS — BODY MASS INDEX: 26.95 KG/M2 | WEIGHT: 210 LBS | HEIGHT: 74 IN

## 2023-03-31 DIAGNOSIS — S50.01XA CONTUSION OF RIGHT ELBOW, INITIAL ENCOUNTER: ICD-10-CM

## 2023-03-31 PROCEDURE — 99213 OFFICE O/P EST LOW 20 MIN: CPT

## 2023-03-31 RX ORDER — DICLOFENAC SODIUM 75 MG/1
75 TABLET, DELAYED RELEASE ORAL
Qty: 60 | Refills: 0 | Status: ACTIVE | COMMUNITY
Start: 2023-03-31 | End: 1900-01-01

## 2023-03-31 NOTE — PHYSICAL EXAM
[NL (150)] : flexion 150 degrees [NL (0)] : extension 0 degrees [NL (90)] : supination 90 degrees [Right] : right elbow [There are no fractures, subluxations or dislocations. No significant abnormalities are seen] : There are no fractures, subluxations or dislocations. No significant abnormalities are seen [] : no swelling [FreeTextEntry3] : also healing laceration proximal to the lateral epicondyle sutures have been removed

## 2023-03-31 NOTE — ASSESSMENT
[FreeTextEntry1] : Has traumatic lateral epicondylitis. will continue with therapy. discussed the role of elbow strap and ice and change of position of hand with lifting. offered inj today he declines. will add diclofenac and f/u 4 weeks if symptoms persist consider csi

## 2023-03-31 NOTE — HISTORY OF PRESENT ILLNESS
[7] : 7 [Dull/Aching] : dull/aching [Throbbing] : throbbing [Intermittent] : intermittent [Nothing helps with pain getting better] : Nothing helps with pain getting better [de-identified] : \par 3-31-23- 5 weeks s/p elbow contusion / traumatic lateral epicondylitis. Since the initial injury he had another injury with a laceration proximal to his initial injury that needed sutures. He also continues to have some pain radiating into the wrist with wrist extension. would like to continue with therapy \par \par \par RT elbow injury 2 weeks ago. Pt has a truck door slammed into his elbow by the wind. pt has had persistent pain since then.  [] : no [FreeTextEntry1] : RT Elbow

## 2023-04-28 ENCOUNTER — APPOINTMENT (OUTPATIENT)
Dept: ORTHOPEDIC SURGERY | Facility: CLINIC | Age: 38
End: 2023-04-28

## 2023-06-02 ENCOUNTER — TRANSCRIPTION ENCOUNTER (OUTPATIENT)
Age: 38
End: 2023-06-02

## 2023-11-07 NOTE — ED ADULT NURSE NOTE - TEMPLATE
Received a fax confirmation from [facility] that [place] Loss [id number] was approved by patient's insurance plan. Copay is $3,847.89 effective 11/3/2023-1/26/2024. Per fax, patient will be contacted by [facility] to discuss patient assistance options to reduce cost of copay.
Wounds

## 2023-12-01 ENCOUNTER — APPOINTMENT (OUTPATIENT)
Dept: ORTHOPEDIC SURGERY | Facility: CLINIC | Age: 38
End: 2023-12-01
Payer: MEDICAID

## 2023-12-01 VITALS — WEIGHT: 210 LBS | HEIGHT: 74 IN | BODY MASS INDEX: 26.95 KG/M2

## 2023-12-01 DIAGNOSIS — M77.12 LATERAL EPICONDYLITIS, LEFT ELBOW: ICD-10-CM

## 2023-12-01 DIAGNOSIS — M77.11 LATERAL EPICONDYLITIS, RIGHT ELBOW: ICD-10-CM

## 2023-12-01 PROCEDURE — 73080 X-RAY EXAM OF ELBOW: CPT | Mod: 50

## 2023-12-01 PROCEDURE — 99213 OFFICE O/P EST LOW 20 MIN: CPT

## 2024-07-30 ENCOUNTER — NON-APPOINTMENT (OUTPATIENT)
Age: 39
End: 2024-07-30

## 2024-12-23 ENCOUNTER — EMERGENCY (EMERGENCY)
Facility: HOSPITAL | Age: 39
LOS: 1 days | Discharge: HOME | End: 2024-12-23
Attending: EMERGENCY MEDICINE | Admitting: EMERGENCY MEDICINE
Payer: MEDICAID

## 2024-12-23 VITALS
OXYGEN SATURATION: 100 % | WEIGHT: 199.96 LBS | SYSTOLIC BLOOD PRESSURE: 163 MMHG | DIASTOLIC BLOOD PRESSURE: 103 MMHG | HEIGHT: 73 IN | TEMPERATURE: 96 F | RESPIRATION RATE: 18 BRPM | HEART RATE: 101 BPM

## 2024-12-23 VITALS
RESPIRATION RATE: 18 BRPM | SYSTOLIC BLOOD PRESSURE: 139 MMHG | HEART RATE: 72 BPM | OXYGEN SATURATION: 98 % | TEMPERATURE: 99 F | DIASTOLIC BLOOD PRESSURE: 89 MMHG

## 2024-12-23 LAB
ALBUMIN SERPL ELPH-MCNC: 4.3 G/DL — SIGNIFICANT CHANGE UP (ref 3.3–5)
ALP SERPL-CCNC: 55 U/L — SIGNIFICANT CHANGE UP (ref 40–120)
ALT FLD-CCNC: 30 U/L — SIGNIFICANT CHANGE UP (ref 12–78)
ANION GAP SERPL CALC-SCNC: 8 MMOL/L — SIGNIFICANT CHANGE UP (ref 5–17)
APPEARANCE UR: CLEAR — SIGNIFICANT CHANGE UP
AST SERPL-CCNC: 20 U/L — SIGNIFICANT CHANGE UP (ref 15–37)
BASOPHILS # BLD AUTO: 0.04 K/UL — SIGNIFICANT CHANGE UP (ref 0–0.2)
BASOPHILS NFR BLD AUTO: 0.5 % — SIGNIFICANT CHANGE UP (ref 0–2)
BILIRUB SERPL-MCNC: 0.9 MG/DL — SIGNIFICANT CHANGE UP (ref 0.2–1.2)
BILIRUB UR-MCNC: NEGATIVE — SIGNIFICANT CHANGE UP
BUN SERPL-MCNC: 11 MG/DL — SIGNIFICANT CHANGE UP (ref 7–23)
CALCIUM SERPL-MCNC: 9.4 MG/DL — SIGNIFICANT CHANGE UP (ref 8.5–10.1)
CHLORIDE SERPL-SCNC: 104 MMOL/L — SIGNIFICANT CHANGE UP (ref 96–108)
CO2 SERPL-SCNC: 25 MMOL/L — SIGNIFICANT CHANGE UP (ref 22–31)
COLOR SPEC: YELLOW — SIGNIFICANT CHANGE UP
CREAT SERPL-MCNC: 0.97 MG/DL — SIGNIFICANT CHANGE UP (ref 0.5–1.3)
DIFF PNL FLD: NEGATIVE — SIGNIFICANT CHANGE UP
EGFR: 101 ML/MIN/1.73M2 — SIGNIFICANT CHANGE UP
EOSINOPHIL # BLD AUTO: 0.04 K/UL — SIGNIFICANT CHANGE UP (ref 0–0.5)
EOSINOPHIL NFR BLD AUTO: 0.5 % — SIGNIFICANT CHANGE UP (ref 0–6)
GLUCOSE SERPL-MCNC: 104 MG/DL — HIGH (ref 70–99)
GLUCOSE UR QL: NEGATIVE MG/DL — SIGNIFICANT CHANGE UP
HCT VFR BLD CALC: 43.3 % — SIGNIFICANT CHANGE UP (ref 39–50)
HGB BLD-MCNC: 15.3 G/DL — SIGNIFICANT CHANGE UP (ref 13–17)
IMM GRANULOCYTES NFR BLD AUTO: 0.4 % — SIGNIFICANT CHANGE UP (ref 0–0.9)
KETONES UR-MCNC: NEGATIVE MG/DL — SIGNIFICANT CHANGE UP
LEUKOCYTE ESTERASE UR-ACNC: NEGATIVE — SIGNIFICANT CHANGE UP
LIDOCAIN IGE QN: 32 U/L — SIGNIFICANT CHANGE UP (ref 13–75)
LYMPHOCYTES # BLD AUTO: 1.4 K/UL — SIGNIFICANT CHANGE UP (ref 1–3.3)
LYMPHOCYTES # BLD AUTO: 17.1 % — SIGNIFICANT CHANGE UP (ref 13–44)
MCHC RBC-ENTMCNC: 33.6 PG — SIGNIFICANT CHANGE UP (ref 27–34)
MCHC RBC-ENTMCNC: 35.3 G/DL — SIGNIFICANT CHANGE UP (ref 32–36)
MCV RBC AUTO: 95 FL — SIGNIFICANT CHANGE UP (ref 80–100)
MONOCYTES # BLD AUTO: 0.53 K/UL — SIGNIFICANT CHANGE UP (ref 0–0.9)
MONOCYTES NFR BLD AUTO: 6.5 % — SIGNIFICANT CHANGE UP (ref 2–14)
NEUTROPHILS # BLD AUTO: 6.14 K/UL — SIGNIFICANT CHANGE UP (ref 1.8–7.4)
NEUTROPHILS NFR BLD AUTO: 75 % — SIGNIFICANT CHANGE UP (ref 43–77)
NITRITE UR-MCNC: NEGATIVE — SIGNIFICANT CHANGE UP
NRBC # BLD: 0 /100 WBCS — SIGNIFICANT CHANGE UP (ref 0–0)
PH UR: 6 — SIGNIFICANT CHANGE UP (ref 5–8)
PLATELET # BLD AUTO: 227 K/UL — SIGNIFICANT CHANGE UP (ref 150–400)
POTASSIUM SERPL-MCNC: 3.9 MMOL/L — SIGNIFICANT CHANGE UP (ref 3.5–5.3)
POTASSIUM SERPL-SCNC: 3.9 MMOL/L — SIGNIFICANT CHANGE UP (ref 3.5–5.3)
PROT SERPL-MCNC: 7.2 G/DL — SIGNIFICANT CHANGE UP (ref 6–8.3)
PROT UR-MCNC: NEGATIVE MG/DL — SIGNIFICANT CHANGE UP
RBC # BLD: 4.56 M/UL — SIGNIFICANT CHANGE UP (ref 4.2–5.8)
RBC # FLD: 11.2 % — SIGNIFICANT CHANGE UP (ref 10.3–14.5)
SODIUM SERPL-SCNC: 137 MMOL/L — SIGNIFICANT CHANGE UP (ref 135–145)
SP GR SPEC: 1 — SIGNIFICANT CHANGE UP (ref 1–1.03)
UROBILINOGEN FLD QL: 0.2 MG/DL — SIGNIFICANT CHANGE UP (ref 0.2–1)
WBC # BLD: 8.18 K/UL — SIGNIFICANT CHANGE UP (ref 3.8–10.5)
WBC # FLD AUTO: 8.18 K/UL — SIGNIFICANT CHANGE UP (ref 3.8–10.5)

## 2024-12-23 PROCEDURE — 85025 COMPLETE CBC W/AUTO DIFF WBC: CPT

## 2024-12-23 PROCEDURE — 74177 CT ABD & PELVIS W/CONTRAST: CPT | Mod: 26,MC

## 2024-12-23 PROCEDURE — 76870 US EXAM SCROTUM: CPT | Mod: 26

## 2024-12-23 PROCEDURE — 99285 EMERGENCY DEPT VISIT HI MDM: CPT | Mod: 25

## 2024-12-23 PROCEDURE — 80053 COMPREHEN METABOLIC PANEL: CPT

## 2024-12-23 PROCEDURE — 76870 US EXAM SCROTUM: CPT

## 2024-12-23 PROCEDURE — 96375 TX/PRO/DX INJ NEW DRUG ADDON: CPT

## 2024-12-23 PROCEDURE — 96376 TX/PRO/DX INJ SAME DRUG ADON: CPT

## 2024-12-23 PROCEDURE — 93975 VASCULAR STUDY: CPT

## 2024-12-23 PROCEDURE — 36415 COLL VENOUS BLD VENIPUNCTURE: CPT

## 2024-12-23 PROCEDURE — 99285 EMERGENCY DEPT VISIT HI MDM: CPT

## 2024-12-23 PROCEDURE — 83690 ASSAY OF LIPASE: CPT

## 2024-12-23 PROCEDURE — 93975 VASCULAR STUDY: CPT | Mod: 26

## 2024-12-23 PROCEDURE — 74177 CT ABD & PELVIS W/CONTRAST: CPT | Mod: MC

## 2024-12-23 PROCEDURE — 81003 URINALYSIS AUTO W/O SCOPE: CPT

## 2024-12-23 PROCEDURE — 96374 THER/PROPH/DIAG INJ IV PUSH: CPT | Mod: XU

## 2024-12-23 RX ORDER — LABETALOL 100 MG/1
10 TABLET, FILM COATED ORAL ONCE
Refills: 0 | Status: DISCONTINUED | OUTPATIENT
Start: 2024-12-23 | End: 2024-12-23

## 2024-12-23 RX ORDER — OXYCODONE AND ACETAMINOPHEN 5; 325 MG/1; MG/1
1 TABLET ORAL
Qty: 24 | Refills: 0
Start: 2024-12-23 | End: 2024-12-28

## 2024-12-23 RX ORDER — ONDANSETRON HYDROCHLORIDE 4 MG/1
1 TABLET, FILM COATED ORAL
Qty: 20 | Refills: 0
Start: 2024-12-23 | End: 2024-12-27

## 2024-12-23 RX ORDER — ACETAMINOPHEN 500MG 500 MG/1
1000 TABLET, COATED ORAL ONCE
Refills: 0 | Status: COMPLETED | OUTPATIENT
Start: 2024-12-23 | End: 2024-12-23

## 2024-12-23 RX ORDER — ONDANSETRON HYDROCHLORIDE 4 MG/1
4 TABLET, FILM COATED ORAL ONCE
Refills: 0 | Status: COMPLETED | OUTPATIENT
Start: 2024-12-23 | End: 2024-12-23

## 2024-12-23 RX ORDER — SODIUM CHLORIDE 9 MG/ML
1000 INJECTION, SOLUTION INTRAMUSCULAR; INTRAVENOUS; SUBCUTANEOUS ONCE
Refills: 0 | Status: COMPLETED | OUTPATIENT
Start: 2024-12-23 | End: 2024-12-23

## 2024-12-23 RX ADMIN — SODIUM CHLORIDE 1000 MILLILITER(S): 9 INJECTION, SOLUTION INTRAMUSCULAR; INTRAVENOUS; SUBCUTANEOUS at 18:31

## 2024-12-23 RX ADMIN — ACETAMINOPHEN 500MG 400 MILLIGRAM(S): 500 TABLET, COATED ORAL at 22:00

## 2024-12-23 RX ADMIN — ONDANSETRON HYDROCHLORIDE 4 MILLIGRAM(S): 4 TABLET, FILM COATED ORAL at 18:34

## 2024-12-23 RX ADMIN — ONDANSETRON HYDROCHLORIDE 4 MILLIGRAM(S): 4 TABLET, FILM COATED ORAL at 21:59

## 2024-12-23 RX ADMIN — Medication 4 MILLIGRAM(S): at 18:34

## 2024-12-23 NOTE — ED PROVIDER NOTE - OBJECTIVE STATEMENT
39-year-old male with no significant past medical history presents to the ED with complaints of bilateral flank pain radiating to the lower abdomen associated with dysuria x 2 weeks.  Patient was seen at an urgent care 1 week ago and prescribed Bactrim x 10 days with no relief of symptoms.  In the urgent care patient is unsure if urine was positive for a UTI at that time.  Patient denies any fevers, penile discharge, history of STDs.

## 2024-12-23 NOTE — ED PROVIDER NOTE - CARE PROVIDER_API CALL
Dequan Marie  Urology  [address], [address]  [place] [address]  Phone: [phone]  Fax: [phone]  Follow Up Time:

## 2024-12-23 NOTE — ED ADULT NURSE NOTE - OBJECTIVE STATEMENT
A&Ox4 ambulatory from triage with c/o bilateral flank pain radiating to the lower abdomen associated with dysuria x 2 weeks.  Patient was seen at an urgent care 1 week ago and prescribed Bactrim x 10 days with no relief of symptoms.

## 2024-12-23 NOTE — ED ADULT NURSE NOTE - NSICDXPASTMEDICALHX_GEN_ALL_CORE_FT
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

## 2024-12-23 NOTE — ED ADULT NURSE NOTE - NSFALLUNIVINTERV_ED_ALL_ED
Bed/Stretcher in lowest position, wheels locked, appropriate side rails in place/Call bell, personal items and telephone in reach/Instruct patient to call for assistance before getting out of bed/chair/stretcher/Non-slip footwear applied when patient is off stretcher/[place] to call system/Physically safe environment - no spills, clutter or unnecessary equipment/Purposeful proactive rounding/Room/bathroom lighting operational, light cord in reach

## 2024-12-23 NOTE — ED ADULT NURSE NOTE - CAS EDP DISCH TYPE
CPAP/BiPAP TIPS     Please be advised:   Do not drive while sleepy   Take CPAP/BiPAP machine to any procedure that requires sedation  When should I clean my machine & supplies?  DAILY  Wipe mask cushions or nasal pillow   Empty & rinse water chamber- refill with distilled water  WEEKLY  Clean mask cushions or nasal pillow, headgear, tubing, and humidifier chamber with mild soap (Elly) and water   If water chamber has hard water buildup (white crust), soak in warm water & vinegar mix 50/50.  Rinse and hang dry    When should supplies be replaced?  Contact your home care company for replacement supplies, or if your machine is malfunctioning  *Below is a general guideline of what we recommend. Replacement of supplies differs depending on your insurance company*  MONTHLY: Replace filter and mask cushion  MONTHS: Replace headgear and tubing    Travel Tips  Keep CPAP/BiPAP in original bag when traveling, and place luggage tag on bag  Most airlines consider CPAP/BiPAP to be a medical device, therefore it is a free carry-on item  If unable to get distilled water, bottled water is safe while traveling. DO NOT use tap water  When traveling outside the [place] only a power adapter is necessary (CPAP can operate without a converter), bring an extension cord  Consider purchasing or renting a travel CPAP (not covered by insurance)  Dry Mouth/Nose  Turn up the humidity on your machine (select “Options” from the home screen)  Place a cool mist humidifier at your bedside  Over-the-counter remedies: Biotene, XyliMelts, NasoGel   Air Leak  Try adjusting your mask/headgear while laying in sleeping position vs. sitting up  Wash and dry your face prior to putting your mask on  If applicable: shave facial hair at night (or before wearing CPAP)  Purchase “RemZzzs” (through home care co., cpap.Attracta, or remzzzs.Attracta)  100% cotton knit barrier that goes between your mask cushion and your skin  Replace your mask cushion (at least once per month)  and/or headgear (every 3-6 months)  Nasal Congestion  CPAP therapy can cause nasal passages to dry out, & mucous membranes try to protect the nasal passages by producing excess mucous, so congestion results.  Over-the counter remedies: Flonase, Nasacort, Sinus Rinses (Neti-Pot), DO NOT USE Afrin  Try a mask that goes over the nose and mouth  Skin Irritation  Clean supplies regularly (Citrus II Mask Wipes, Control III disinfectant solution)  Try over the counter creams such as hydrocortisone 1% (apply in the morning after showering)  Your headgear may be too tight, replace supplies so you don't need to adjust so tightly  Try RemZzzs (100% cotton knit barrier that goes between your mask cushion and your skin)  Gas/Bloating  Try a different sleeping position to keep air out of the stomach. Lay on the left side or rotate to the right side. Incline with pillows or lay flat.  Over-the-counter remedies: Simethicone     Home

## 2024-12-23 NOTE — ED PROVIDER NOTE - NSFOLLOWUPINSTRUCTIONS_ED_ALL_ED_FT
Dysuria  Dysuria is pain or discomfort when you pee. The pain may be felt in your urethra, which is the part of your body that drains pee (urine) from your bladder. The pain may also be felt near your genitals, groin, or in your lower belly or back.    You may have to pee often or have the sudden feeling that you need to pee. This condition can affect anyone, but it's more common in females. It can be caused by:  A urinary tract infection (UTI).  Kidney stones or bladder stones.  Some sexually transmitted infections (STIs).  Dehydration. This is when there's not enough water in your body.  Irritation and swelling in the vagina.  The use of some medicines.  The use of some soaps or products with a scent.  Follow these instructions at home:  Medicines    A prescription pill bottle with an example of a pill.  Take your medicines only as told.  Take your antibiotics as told. Do not stop taking them even if you start to feel better.  Eating and drinking    Drink enough fluid to keep your pee pale yellow.  Certain drinks can make the pain worse. Avoid:  Drinks with caffeine in them.  Tea.  Alcohol. In males, alcohol may irritate the prostate.  General instructions    Watch your condition for any changes, such as color changes in your pee.  Pee often. Do not hold your pee for a long time.  If you're female, wipe from front to back after you pee or poop. Use each tissue only once when you wipe.  Pee after you have sex.  If you've had any tests done, it's up to you to get your test results. Ask your health care provider, or the department doing the test, when your results will be ready.  Contact a health care provider if:  You have a fever or chills.  You have pain in your back or sides.  You throw up or feel like you may throw up.  You have blood in your pee.  You're not peeing as often as normal.  You feel very weak.  Get help right away if:  You have very bad pain that doesn't get better with medicine.  You're confused.  You have a fast heartbeat while resting.  This information is not intended to replace advice given to you by your health care provider. Make sure you discuss any questions you have with your health care provider.    Document Revised: 04/23/2024 Document Reviewed: 04/23/2024  Elsevier Patient Education © 2024 [employer] Inc.

## 2024-12-23 NOTE — ED PROVIDER NOTE - CLINICAL SUMMARY MEDICAL DECISION MAKING FREE TEXT BOX
39-year-old male with bilateral flank pain radiating to the lower abdomen associated with dysuria.  CT, labs, UA rule out pyelonephritis, cystitis, kidney stones, diverticulitis.

## 2024-12-23 NOTE — ED PROVIDER NOTE - PATIENT PORTAL LINK FT
You can access the FollowMyHealth Patient Portal offered by [facility] by registering at the following website: [email or web address]. By joining [facility]’s FollowMyHealth portal, you will also be able to view your health information using other applications (apps) compatible with our system.

## 2024-12-31 ENCOUNTER — APPOINTMENT (OUTPATIENT)
Dept: UROLOGY | Facility: CLINIC | Age: 39
End: 2024-12-31
Payer: MEDICAID

## 2024-12-31 VITALS
HEART RATE: 86 BPM | BODY MASS INDEX: 26.51 KG/M2 | WEIGHT: 200 LBS | SYSTOLIC BLOOD PRESSURE: 156 MMHG | OXYGEN SATURATION: 97 % | HEIGHT: 73 IN | DIASTOLIC BLOOD PRESSURE: 103 MMHG

## 2024-12-31 DIAGNOSIS — R10.2 PELVIC AND PERINEAL PAIN: ICD-10-CM

## 2024-12-31 DIAGNOSIS — N41.9 INFLAMMATORY DISEASE OF PROSTATE, UNSPECIFIED: ICD-10-CM

## 2024-12-31 DIAGNOSIS — N50.819 TESTICULAR PAIN, UNSPECIFIED: ICD-10-CM

## 2024-12-31 PROCEDURE — G2211 COMPLEX E/M VISIT ADD ON: CPT | Mod: NC

## 2024-12-31 PROCEDURE — 99204 OFFICE O/P NEW MOD 45 MIN: CPT

## 2024-12-31 RX ORDER — ONDANSETRON 4 MG/1
4 TABLET ORAL EVERY 8 HOURS
Qty: 42 | Refills: 2 | Status: ACTIVE | COMMUNITY
Start: 2024-12-31 | End: 1900-01-01

## 2024-12-31 RX ORDER — OXYCODONE AND ACETAMINOPHEN 5; 325 MG/1; MG/1
5-325 TABLET ORAL
Qty: 12 | Refills: 0 | Status: ACTIVE | COMMUNITY
Start: 2024-12-31 | End: 1900-01-01

## 2025-01-01 LAB
APPEARANCE: CLEAR
BACTERIA: NEGATIVE /HPF
BILIRUBIN URINE: NEGATIVE
BLOOD URINE: NEGATIVE
CAST: 0 /LPF
COLOR: YELLOW
EPITHELIAL CELLS: 0 /HPF
GLUCOSE QUALITATIVE U: NEGATIVE MG/DL
KETONES URINE: NEGATIVE MG/DL
LEUKOCYTE ESTERASE URINE: NEGATIVE
MICROSCOPIC-UA: NORMAL
NITRITE URINE: NEGATIVE
PH URINE: 6.5
PROTEIN URINE: NEGATIVE MG/DL
RED BLOOD CELLS URINE: 0 /HPF
SPECIFIC GRAVITY URINE: 1
UROBILINOGEN URINE: 0.2 MG/DL
WHITE BLOOD CELLS URINE: 0 /HPF

## 2025-01-02 LAB
BACTERIA UR CULT: NORMAL
C TRACH RRNA SPEC QL NAA+PROBE: NOT DETECTED
N GONORRHOEA RRNA SPEC QL NAA+PROBE: NOT DETECTED
SOURCE AMPLIFICATION: NORMAL

## 2025-06-15 ENCOUNTER — EMERGENCY (EMERGENCY)
Facility: HOSPITAL | Age: 40
LOS: 1 days | End: 2025-06-15
Attending: EMERGENCY MEDICINE | Admitting: EMERGENCY MEDICINE
Payer: MEDICAID

## 2025-06-15 VITALS
OXYGEN SATURATION: 97 % | HEART RATE: 94 BPM | WEIGHT: 210.1 LBS | RESPIRATION RATE: 18 BRPM | DIASTOLIC BLOOD PRESSURE: 94 MMHG | SYSTOLIC BLOOD PRESSURE: 155 MMHG | TEMPERATURE: 98 F | HEIGHT: 73 IN

## 2025-06-15 VITALS
TEMPERATURE: 98 F | SYSTOLIC BLOOD PRESSURE: 135 MMHG | DIASTOLIC BLOOD PRESSURE: 75 MMHG | OXYGEN SATURATION: 99 % | RESPIRATION RATE: 18 BRPM | HEART RATE: 59 BPM

## 2025-06-15 LAB
ALBUMIN SERPL ELPH-MCNC: 3.9 G/DL — SIGNIFICANT CHANGE UP (ref 3.3–5)
ALP SERPL-CCNC: 55 U/L — SIGNIFICANT CHANGE UP (ref 40–120)
ALT FLD-CCNC: 35 U/L — SIGNIFICANT CHANGE UP (ref 12–78)
ANION GAP SERPL CALC-SCNC: 4 MMOL/L — LOW (ref 5–17)
APPEARANCE UR: CLEAR — SIGNIFICANT CHANGE UP
AST SERPL-CCNC: 18 U/L — SIGNIFICANT CHANGE UP (ref 15–37)
BASOPHILS # BLD AUTO: 0.04 K/UL — SIGNIFICANT CHANGE UP (ref 0–0.2)
BASOPHILS NFR BLD AUTO: 0.5 % — SIGNIFICANT CHANGE UP (ref 0–2)
BILIRUB SERPL-MCNC: 0.7 MG/DL — SIGNIFICANT CHANGE UP (ref 0.2–1.2)
BILIRUB UR-MCNC: NEGATIVE — SIGNIFICANT CHANGE UP
BUN SERPL-MCNC: 21 MG/DL — SIGNIFICANT CHANGE UP (ref 7–23)
CALCIUM SERPL-MCNC: 9 MG/DL — SIGNIFICANT CHANGE UP (ref 8.5–10.1)
CHLORIDE SERPL-SCNC: 110 MMOL/L — HIGH (ref 96–108)
CO2 SERPL-SCNC: 24 MMOL/L — SIGNIFICANT CHANGE UP (ref 22–31)
COLOR SPEC: YELLOW — SIGNIFICANT CHANGE UP
CREAT SERPL-MCNC: 1.4 MG/DL — HIGH (ref 0.5–1.3)
DIFF PNL FLD: NEGATIVE — SIGNIFICANT CHANGE UP
EGFR: 65 ML/MIN/1.73M2 — SIGNIFICANT CHANGE UP
EGFR: 65 ML/MIN/1.73M2 — SIGNIFICANT CHANGE UP
EOSINOPHIL # BLD AUTO: 0.03 K/UL — SIGNIFICANT CHANGE UP (ref 0–0.5)
EOSINOPHIL NFR BLD AUTO: 0.4 % — SIGNIFICANT CHANGE UP (ref 0–6)
GLUCOSE SERPL-MCNC: 101 MG/DL — HIGH (ref 70–99)
GLUCOSE UR QL: NEGATIVE MG/DL — SIGNIFICANT CHANGE UP
HCT VFR BLD CALC: 45.8 % — SIGNIFICANT CHANGE UP (ref 39–50)
HGB BLD-MCNC: 15.4 G/DL — SIGNIFICANT CHANGE UP (ref 13–17)
IMM GRANULOCYTES NFR BLD AUTO: 0.3 % — SIGNIFICANT CHANGE UP (ref 0–0.9)
KETONES UR QL: ABNORMAL MG/DL
LEUKOCYTE ESTERASE UR-ACNC: NEGATIVE — SIGNIFICANT CHANGE UP
LIDOCAIN IGE QN: 31 U/L — SIGNIFICANT CHANGE UP (ref 13–75)
LYMPHOCYTES # BLD AUTO: 1.33 K/UL — SIGNIFICANT CHANGE UP (ref 1–3.3)
LYMPHOCYTES # BLD AUTO: 17.1 % — SIGNIFICANT CHANGE UP (ref 13–44)
MCHC RBC-ENTMCNC: 32.4 PG — SIGNIFICANT CHANGE UP (ref 27–34)
MCHC RBC-ENTMCNC: 33.6 G/DL — SIGNIFICANT CHANGE UP (ref 32–36)
MCV RBC AUTO: 96.2 FL — SIGNIFICANT CHANGE UP (ref 80–100)
MONOCYTES # BLD AUTO: 0.52 K/UL — SIGNIFICANT CHANGE UP (ref 0–0.9)
MONOCYTES NFR BLD AUTO: 6.7 % — SIGNIFICANT CHANGE UP (ref 2–14)
NEUTROPHILS # BLD AUTO: 5.83 K/UL — SIGNIFICANT CHANGE UP (ref 1.8–7.4)
NEUTROPHILS NFR BLD AUTO: 75 % — SIGNIFICANT CHANGE UP (ref 43–77)
NITRITE UR-MCNC: NEGATIVE — SIGNIFICANT CHANGE UP
NRBC BLD AUTO-RTO: 0 /100 WBCS — SIGNIFICANT CHANGE UP (ref 0–0)
PH UR: 5.5 — SIGNIFICANT CHANGE UP (ref 5–8)
PLATELET # BLD AUTO: 214 K/UL — SIGNIFICANT CHANGE UP (ref 150–400)
POTASSIUM SERPL-MCNC: 4.4 MMOL/L — SIGNIFICANT CHANGE UP (ref 3.5–5.3)
POTASSIUM SERPL-SCNC: 4.4 MMOL/L — SIGNIFICANT CHANGE UP (ref 3.5–5.3)
PROT SERPL-MCNC: 7 G/DL — SIGNIFICANT CHANGE UP (ref 6–8.3)
PROT UR-MCNC: NEGATIVE MG/DL — SIGNIFICANT CHANGE UP
RBC # BLD: 4.76 M/UL — SIGNIFICANT CHANGE UP (ref 4.2–5.8)
RBC # FLD: 11.5 % — SIGNIFICANT CHANGE UP (ref 10.3–14.5)
SODIUM SERPL-SCNC: 138 MMOL/L — SIGNIFICANT CHANGE UP (ref 135–145)
SP GR SPEC: 1.02 — SIGNIFICANT CHANGE UP (ref 1–1.03)
UROBILINOGEN FLD QL: 0.2 MG/DL — SIGNIFICANT CHANGE UP (ref 0.2–1)
WBC # BLD: 7.77 K/UL — SIGNIFICANT CHANGE UP (ref 3.8–10.5)
WBC # FLD AUTO: 7.77 K/UL — SIGNIFICANT CHANGE UP (ref 3.8–10.5)

## 2025-06-15 PROCEDURE — 85025 COMPLETE CBC W/AUTO DIFF WBC: CPT

## 2025-06-15 PROCEDURE — 36415 COLL VENOUS BLD VENIPUNCTURE: CPT

## 2025-06-15 PROCEDURE — 99285 EMERGENCY DEPT VISIT HI MDM: CPT

## 2025-06-15 PROCEDURE — 99284 EMERGENCY DEPT VISIT MOD MDM: CPT | Mod: 25

## 2025-06-15 PROCEDURE — 74177 CT ABD & PELVIS W/CONTRAST: CPT | Mod: 26

## 2025-06-15 PROCEDURE — 81003 URINALYSIS AUTO W/O SCOPE: CPT

## 2025-06-15 PROCEDURE — 96375 TX/PRO/DX INJ NEW DRUG ADDON: CPT

## 2025-06-15 PROCEDURE — 80053 COMPREHEN METABOLIC PANEL: CPT

## 2025-06-15 PROCEDURE — 74177 CT ABD & PELVIS W/CONTRAST: CPT

## 2025-06-15 PROCEDURE — 83690 ASSAY OF LIPASE: CPT

## 2025-06-15 PROCEDURE — 96374 THER/PROPH/DIAG INJ IV PUSH: CPT | Mod: XU

## 2025-06-15 RX ORDER — ONDANSETRON HCL/PF 4 MG/2 ML
4 VIAL (ML) INJECTION ONCE
Refills: 0 | Status: COMPLETED | OUTPATIENT
Start: 2025-06-15 | End: 2025-06-15

## 2025-06-15 RX ORDER — KETOROLAC TROMETHAMINE 30 MG/ML
30 INJECTION, SOLUTION INTRAMUSCULAR; INTRAVENOUS ONCE
Refills: 0 | Status: DISCONTINUED | OUTPATIENT
Start: 2025-06-15 | End: 2025-06-15

## 2025-06-15 RX ADMIN — Medication 1000 MILLILITER(S): at 14:01

## 2025-06-15 RX ADMIN — KETOROLAC TROMETHAMINE 30 MILLIGRAM(S): 30 INJECTION, SOLUTION INTRAMUSCULAR; INTRAVENOUS at 16:46

## 2025-06-15 RX ADMIN — Medication 4 MILLIGRAM(S): at 14:03

## 2025-06-15 RX ADMIN — Medication 4 MILLIGRAM(S): at 14:35

## 2025-06-15 RX ADMIN — KETOROLAC TROMETHAMINE 30 MILLIGRAM(S): 30 INJECTION, SOLUTION INTRAMUSCULAR; INTRAVENOUS at 16:16

## 2025-06-15 RX ADMIN — Medication 4 MILLIGRAM(S): at 14:05

## 2025-06-15 NOTE — ED PROVIDER NOTE - OBJECTIVE STATEMENT
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  Patient states he followed up with urology with no known diagnosis to explain patient's abdominal pain.  Patient states that after he eats he has abdominal cramping and nausea.  Symptoms have been waking patient up from sleep.  Patient has not followed up with gastroenterology as of recent.

## 2025-06-15 NOTE — ED PROVIDER NOTE - NSFOLLOWUPINSTRUCTIONS_ED_ALL_ED_FT
Acute Abdominal Pain    WHAT YOU NEED TO KNOW:    What do I need to know about acute abdominal pain? Acute abdominal pain usually starts suddenly and gets worse quickly.  Abdominal Organs    What causes acute abdominal pain? The following are common causes:    A heart attack    An allergic reaction to food, or food poisoning    Acid reflux    Stress    Constipation or a blockage in your bowels    Monthly period pain in females    Inflammation or rupture of your appendix    Swelling or an infection in your abdomen or an organ    An ulcer or a tear in your esophagus, stomach, or intestines    Bleeding in your abdomen or an organ    Stones in your kidney or gallbladder    In women, diseases of the fallopian tubes or ovaries, or an ectopic pregnancy  How is the cause of acute abdominal pain diagnosed? Your healthcare provider will examine you and ask about your symptoms. Tell the provider when your symptoms started and about any recent travel or surgery. Also tell him or her what makes the pain better or worse. Based on what your provider finds from the exam, and your symptoms, you may need any of the following:    Blood tests may be done to check for inflammation or to get information about your overall health.    A sample of your bowel movement may be tested to see if you are absorbing nutrients from your diet. It can also be tested for germs that may be causing your pain.    X-ray, ultrasound, CT, or MRI pictures may be used to check the organs inside your abdomen. You may be given contrast liquid to help the organs show up better in the pictures. Tell the healthcare provider if you have ever had an allergic reaction to contrast liquid. Do not enter the MRI room with anything metal. Metal can cause serious injury. Tell the healthcare provider if you have any metal in or on your body.    An endoscopy is a test to look inside your esophagus, stomach, and small intestine. During an endoscopy, healthcare providers may find problems in your esophagus, stomach, or small intestine. Some problems may be fixed during the endoscopy. Samples may be taken from your esophagus, stomach, or small intestine, and sent to a lab for tests.  Upper Endoscopy      A colonoscopy is a test that is done to look inside your colon. During a colonoscopy, healthcare providers may find problems in your colon. Some problems may be fixed during the colonoscopy. Samples may be taken from your colon and sent to the lab for tests.        An EKG records your heart rhythm and how fast your heart beats. It is used to check for damage to your heart.  How is acute abdominal pain treated? Treatment depends on the cause of your abdominal pain. You may need any of the following:    Medicines may be given to decrease pain, treat an infection, and manage your symptoms, such as constipation.    Surgery may be needed to treat a serious cause of abdominal pain. Examples include surgery to treat appendicitis or a blockage in your bowels.  What can I do to manage acute abdominal pain?    Apply heat on your abdomen for 20 to 30 minutes every 2 hours for as many days as directed. Heat helps decrease pain and muscle spasms.    Keep track of your abdominal pain. This may help your healthcare provider learn what is causing your pain. Include when the pain happens, how long it lasts, and what the pain feels like. Track any other symptoms you have with abdominal pain. Also track what you eat, and any symptoms you have after you eat.  What can I do to prevent acute abdominal pain?    Make changes to the foods you eat, if needed. Do not eat foods that cause abdominal pain or other symptoms. Eat small meals more often. The following changes may also help:  Eat more high-fiber foods if you are constipated. High-fiber foods include fruits, vegetables, whole-grain foods, and legumes such as steve beans.        Do not eat foods that cause gas if you have bloating. Examples include broccoli, cabbage, beans, and carbonated drinks.    Do not have foods or drinks that contain sorbitol or fructose if you have diarrhea or bloating. Some examples are fruit juices, candy, jelly, and sugar-free gum.    Do not eat high-fat foods. Examples include fried foods, cheeseburgers, hot dogs, and desserts.    Make changes to the liquids you drink, if needed. Do not drink liquids that cause pain or make it worse, such as orange juice. Drink liquids throughout the day to stay hydrated. The following changes may also help:  Drink more liquids to prevent dehydration from diarrhea or vomiting. Ask your healthcare provider how much liquid to drink each day and which liquids are best for you.    Limit or do not have caffeine. Caffeine may make symptoms such as heartburn or nausea worse.    Limit or do not drink alcohol. Alcohol can make your abdominal pain worse. Ask your healthcare provider if it is okay for you to drink alcohol. Also ask how much is okay for you to drink. A drink of alcohol is 12 ounces of beer, ½ ounce of liquor, or 5 ounces of wine.    Manage stress. Stress may cause abdominal pain. Your healthcare provider may recommend relaxation techniques and deep breathing exercises to help decrease your stress. Your provider may recommend you talk to someone about your stress or anxiety, such as a counselor or a friend. Get plenty of sleep. Exercise regularly.  [employer] Walking for Exercise      Do not smoke. Nicotine and other chemicals in cigarettes can damage your esophagus and stomach. Ask your healthcare provider for information if you currently smoke and need help to quit. E-cigarettes or smokeless tobacco still contain nicotine. Talk to your healthcare provider before you use these products.  Call your local emergency number (911 in the ) if:    You have any of the following signs of a heart attack:  Squeezing, pressure, or pain in your chest    You may also have any of the following:  Discomfort or pain in your back, neck, jaw, stomach, or arm    Shortness of breath    Nausea or vomiting    Lightheadedness or a sudden cold sweat    When should I seek immediate care?    You vomit blood or cannot stop vomiting.    You have blood in your bowel movement, or it looks like tar.    You have bleeding from your rectum.    Your abdomen is larger than usual, more painful, and hard.    You have severe pain in your abdomen.    You stop passing gas and having bowel movements.    You feel weak, dizzy, or faint.  When should I call my doctor?    You have a fever.    You have new or worsening signs or symptoms.    Your symptoms do not get better with treatment.    You have questions or concerns about your condition or care.  CARE AGREEMENT:    You have the right to help plan your care. Learn about your health condition and how it may be treated. Discuss treatment options with your healthcare providers to decide what care you want to receive. You always have the right to refuse treatment.    © [employer] 1973, 2025

## 2025-06-15 NOTE — ED PROVIDER NOTE - CARE PROVIDER_API CALL
Dariusz Crain  Gastroenterology  [address] [place] [id number]  Phone: [phone]  Fax: [phone]  Follow Up Time:

## 2025-06-15 NOTE — ED PROVIDER NOTE - CLINICAL SUMMARY MEDICAL DECISION MAKING FREE TEXT BOX
39-year-old male with diffuse abdominal pain worse on the left lower quadrant x 6 months.  Labs, UA, CT rule out metastases, diverticulitis, colitis, gastroenteritis, UTI.

## 2025-06-15 NOTE — ED PROVIDER NOTE - PATIENT PORTAL LINK FT
You can access the FollowMyHealth Patient Portal offered by [facility] by registering at the following website: [email or web address]. By joining [facility]’s FollowMyHealth portal, you will also be able to view your health information using other applications (apps) compatible with our system.

## 2025-06-15 NOTE — ED ADULT TRIAGE NOTE - CHIEF COMPLAINT QUOTE
Lower back pain along with left side and abd pain x 1 weeks that is getting increasingly worse and now causing nausea and pain when urinating

## (undated) DEVICE — NDL INJ SCLERO INTERJECT 23G

## (undated) DEVICE — TUBING ENDO EXT OLYMPUS 160 24HR USE

## (undated) DEVICE — TRAP SPECIMEN SPUTUM 40CC

## (undated) DEVICE — BRUSH CYTO ENDO

## (undated) DEVICE — CATH IV SAFE BC 20G X 1.16" (PINK)

## (undated) DEVICE — BRUSH COLONOSCOPY CYTOLOGY

## (undated) DEVICE — CATH ELCTR GLIDE PRB 7FR

## (undated) DEVICE — TUBE RECTAL 24FR

## (undated) DEVICE — SYR LUER LOK 30CC

## (undated) DEVICE — MASK OXYGEN PANORAMIC

## (undated) DEVICE — FORCEP RADIAL JAW 4 W NDL 2.4MM 2.8MM 240CM ORANGE DISP

## (undated) DEVICE — CATH IV SAFE BC 22G X 1" (BLUE)

## (undated) DEVICE — SYR ALLIANCE II INFLATION 60ML

## (undated) DEVICE — TUBING CANNULA SALTER LABS NASAL ADULT 7FT

## (undated) DEVICE — TRAP QUICK CATCH  SINGL CHAMBER

## (undated) DEVICE — SNARE LRG

## (undated) DEVICE — SNARE POLYP SENS 27MM 240CM

## (undated) DEVICE — SET IV PUMP BLOOD 1VALVE 180FILTER NON-DEHP

## (undated) DEVICE — FORCEP RADIAL JAW 4 W NDL 2.2MM 2.8MM 160CM YELLOW DISP

## (undated) DEVICE — RADIAL JAW 4 LG CAPACITY WITH NDL

## (undated) DEVICE — MASK O2 NON REBREATH 3IN1 ADULT

## (undated) DEVICE — ENDOCUFF VISION SZ 2 LG GRN

## (undated) DEVICE — CATH ELECHMSTAT  INJ 7FR 210CM

## (undated) DEVICE — SENS OXI DGT OXISENSOR II ADLN

## (undated) DEVICE — CANISTER SUCTION 1200CC 10/SL

## (undated) DEVICE — SUT HEWSON RETRIEVER

## (undated) DEVICE — SUCTION YANKAUER TAPERED BULBOUS NO VENT

## (undated) DEVICE — FORMALIN CUPS 10% BUFFERED

## (undated) DEVICE — RETRIEVER ROTH NET PLATINUM-UNIVERSAL

## (undated) DEVICE — TUBING IV SET SECOND 34" W/O LOK-BLUNT

## (undated) DEVICE — POLY TRAP ETRAP

## (undated) DEVICE — SYR ORISE GEL SNGL PACK

## (undated) DEVICE — ENDOCUFF VISION SZ 3 SM PRPL

## (undated) DEVICE — SOL IRR NS 0.9% 250ML

## (undated) DEVICE — DRSG CURITY GAUZE SPONGE 4 X 4" 12-PLY

## (undated) DEVICE — BITE BLOCK MAXI RUBBER STAMP

## (undated) DEVICE — TUBING SUCTION CONN 6FT STERILE

## (undated) DEVICE — STERIS DEFENDO 3-PIECE KIT (AIR/WATER, SUCTION & BIOPSY VALVES)

## (undated) DEVICE — FORCEP RADIAL JAW 4 JUMBO 2.8MM 3.2MM 240CM ORANGE DISP

## (undated) DEVICE — PACK IV START WITH CHG

## (undated) DEVICE — SYR LUER SLIP TIP 50CC

## (undated) DEVICE — CLAMP BX HOT RAD JAW 3

## (undated) DEVICE — TUBE O2 SUPL CRUSH RESIS CONN SOUTHSIDE ONLY

## (undated) DEVICE — SYR LUER SLIP TIP 30CC

## (undated) DEVICE — SYR IV POSIFLUSH NS 3ML 30/TY

## (undated) DEVICE — MARKER ENDO SPOT EX

## (undated) DEVICE — Device

## (undated) DEVICE — SOL IRR POUR H2O 500ML

## (undated) DEVICE — VALVE BIOPSY

## (undated) DEVICE — TUBING IV SET SECONDARY 34"

## (undated) DEVICE — ELCTR GROUNDING PAD ADULT COVIDIEN

## (undated) DEVICE — TUBING IV SET GRAVITY 3Y 100" MACRO